# Patient Record
Sex: FEMALE | Race: BLACK OR AFRICAN AMERICAN | Employment: OTHER | URBAN - METROPOLITAN AREA
[De-identification: names, ages, dates, MRNs, and addresses within clinical notes are randomized per-mention and may not be internally consistent; named-entity substitution may affect disease eponyms.]

---

## 2019-08-19 ENCOUNTER — OFFICE VISIT (OUTPATIENT)
Dept: HEMATOLOGY | Age: 69
End: 2019-08-19

## 2019-08-19 ENCOUNTER — HOSPITAL ENCOUNTER (OUTPATIENT)
Dept: LAB | Age: 69
Discharge: HOME | End: 2019-08-19
Payer: MEDICARE

## 2019-08-19 VITALS
TEMPERATURE: 98.2 F | HEART RATE: 66 BPM | SYSTOLIC BLOOD PRESSURE: 154 MMHG | WEIGHT: 197.13 LBS | HEIGHT: 65 IN | BODY MASS INDEX: 32.84 KG/M2 | DIASTOLIC BLOOD PRESSURE: 78 MMHG | OXYGEN SATURATION: 98 %

## 2019-08-19 DIAGNOSIS — R74.8 ELEVATED LIVER ENZYMES: Primary | ICD-10-CM

## 2019-08-19 DIAGNOSIS — R74.8 ELEVATED LIVER ENZYMES: ICD-10-CM

## 2019-08-19 PROBLEM — E11.9 DM TYPE 2 (DIABETES MELLITUS, TYPE 2) (HCC): Status: ACTIVE | Noted: 2019-08-19

## 2019-08-19 PROBLEM — I10 ESSENTIAL HYPERTENSION: Status: ACTIVE | Noted: 2019-08-19

## 2019-08-19 LAB
ALBUMIN SERPL-MCNC: 3.4 G/DL (ref 3.4–5)
ALBUMIN/GLOB SERPL: 0.9 {RATIO} (ref 0.8–1.7)
ALP SERPL-CCNC: 81 U/L (ref 45–117)
ALT SERPL-CCNC: 65 U/L (ref 13–56)
ANION GAP SERPL CALC-SCNC: 7 MMOL/L (ref 3–18)
AST SERPL-CCNC: 45 U/L (ref 10–38)
BASOPHILS # BLD: 0 K/UL (ref 0–0.1)
BASOPHILS NFR BLD: 1 % (ref 0–2)
BILIRUB DIRECT SERPL-MCNC: 0.1 MG/DL (ref 0–0.2)
BILIRUB SERPL-MCNC: 0.3 MG/DL (ref 0.2–1)
BUN SERPL-MCNC: 43 MG/DL (ref 7–18)
BUN/CREAT SERPL: 34 (ref 12–20)
CALCIUM SERPL-MCNC: 9.1 MG/DL (ref 8.5–10.1)
CHLORIDE SERPL-SCNC: 109 MMOL/L (ref 100–111)
CO2 SERPL-SCNC: 29 MMOL/L (ref 21–32)
CREAT SERPL-MCNC: 1.25 MG/DL (ref 0.6–1.3)
DIFFERENTIAL METHOD BLD: ABNORMAL
EOSINOPHIL # BLD: 0.6 K/UL (ref 0–0.4)
EOSINOPHIL NFR BLD: 9 % (ref 0–5)
ERYTHROCYTE [DISTWIDTH] IN BLOOD BY AUTOMATED COUNT: 13.9 % (ref 11.6–14.5)
FERRITIN SERPL-MCNC: 922 NG/ML (ref 8–388)
GLOBULIN SER CALC-MCNC: 3.8 G/DL (ref 2–4)
GLUCOSE SERPL-MCNC: 130 MG/DL (ref 74–99)
HCT VFR BLD AUTO: 32.6 % (ref 35–45)
HGB BLD-MCNC: 10.5 G/DL (ref 12–16)
IRON SATN MFR SERPL: 26 %
IRON SERPL-MCNC: 67 UG/DL (ref 50–175)
LYMPHOCYTES # BLD: 2.8 K/UL (ref 0.9–3.6)
LYMPHOCYTES NFR BLD: 42 % (ref 21–52)
MCH RBC QN AUTO: 28.4 PG (ref 24–34)
MCHC RBC AUTO-ENTMCNC: 32.2 G/DL (ref 31–37)
MCV RBC AUTO: 88.1 FL (ref 74–97)
MONOCYTES # BLD: 0.3 K/UL (ref 0.05–1.2)
MONOCYTES NFR BLD: 5 % (ref 3–10)
NEUTS SEG # BLD: 2.9 K/UL (ref 1.8–8)
NEUTS SEG NFR BLD: 43 % (ref 40–73)
PLATELET # BLD AUTO: 267 K/UL (ref 135–420)
PMV BLD AUTO: 10.9 FL (ref 9.2–11.8)
POTASSIUM SERPL-SCNC: 4 MMOL/L (ref 3.5–5.5)
PROT SERPL-MCNC: 7.2 G/DL (ref 6.4–8.2)
RBC # BLD AUTO: 3.7 M/UL (ref 4.2–5.3)
SODIUM SERPL-SCNC: 145 MMOL/L (ref 136–145)
TIBC SERPL-MCNC: 255 UG/DL (ref 250–450)
WBC # BLD AUTO: 6.7 K/UL (ref 4.6–13.2)

## 2019-08-19 PROCEDURE — 83516 IMMUNOASSAY NONANTIBODY: CPT

## 2019-08-19 PROCEDURE — 82728 ASSAY OF FERRITIN: CPT

## 2019-08-19 PROCEDURE — 85025 COMPLETE CBC W/AUTO DIFF WBC: CPT

## 2019-08-19 PROCEDURE — 82103 ALPHA-1-ANTITRYPSIN TOTAL: CPT

## 2019-08-19 PROCEDURE — 86256 FLUORESCENT ANTIBODY TITER: CPT

## 2019-08-19 PROCEDURE — 86704 HEP B CORE ANTIBODY TOTAL: CPT

## 2019-08-19 PROCEDURE — 80076 HEPATIC FUNCTION PANEL: CPT

## 2019-08-19 PROCEDURE — 87340 HEPATITIS B SURFACE AG IA: CPT

## 2019-08-19 PROCEDURE — 83540 ASSAY OF IRON: CPT

## 2019-08-19 PROCEDURE — 80048 BASIC METABOLIC PNL TOTAL CA: CPT

## 2019-08-19 PROCEDURE — 36415 COLL VENOUS BLD VENIPUNCTURE: CPT

## 2019-08-19 PROCEDURE — 86708 HEPATITIS A ANTIBODY: CPT

## 2019-08-19 PROCEDURE — 86706 HEP B SURFACE ANTIBODY: CPT

## 2019-08-19 PROCEDURE — 86038 ANTINUCLEAR ANTIBODIES: CPT

## 2019-08-19 RX ORDER — MELATONIN
DAILY
COMMUNITY

## 2019-08-19 RX ORDER — LOSARTAN POTASSIUM AND HYDROCHLOROTHIAZIDE 12.5; 1 MG/1; MG/1
1 TABLET ORAL DAILY
COMMUNITY
End: 2021-11-15

## 2019-08-19 RX ORDER — ALLOPURINOL 300 MG/1
TABLET ORAL DAILY
COMMUNITY

## 2019-08-19 RX ORDER — CHOLECALCIFEROL (VITAMIN D3) 125 MCG
50 CAPSULE ORAL DAILY
COMMUNITY

## 2019-08-19 RX ORDER — ASPIRIN 81 MG/1
TABLET ORAL DAILY
COMMUNITY

## 2019-08-19 RX ORDER — BRIMONIDINE TARTRATE, TIMOLOL MALEATE 2; 5 MG/ML; MG/ML
1 SOLUTION/ DROPS OPHTHALMIC EVERY 12 HOURS
COMMUNITY

## 2019-08-19 RX ORDER — LOVASTATIN 20 MG/1
20 TABLET ORAL
COMMUNITY
End: 2022-06-06

## 2019-08-19 NOTE — Clinical Note
9/14/19 Patient: Ayad Ribeiro YOB: 1950 Date of Visit: 8/19/2019 Murrell Carrel, MD 
[address] Floor [facility] [address] [address] VIA Facsimile: [phone] Dear Murrell Carrel, MD, Thank you for referring Ms. Ayad Ribeiro to [facility] [address] for evaluation. My notes for this consultation are attached. If you have questions, please do not hesitate to call me. I look forward to following your patient along with you. Sincerely, Cari Smith MD

## 2019-08-19 NOTE — PROGRESS NOTES
[address] [place] [address], Annita VELAZQUEZ Micael Asper, MD Sarita Ireland, PA-C Rochester Burow, ACNP-FRANCISCO Franco [facility] Hospital[facility]-BC   JOSE G Bolton, [facility]       Amira Deputado [address]    at [address], [address] [place] [address], [facility] 22.    [phone]    FAX: [address] [place] [address], [address]    [phone]    FAX: [phone]       Patient Care Team:  Johan Ruiz MD as PCP - General (Internal Medicine)  Mandie Gan MD (Gastroenterology)  Monie Shaver DPM (Podiatry)  Jesse Nuñez MD (Nephrology)  Yazmin Alvarado NP (Nurse Practitioner)  Janette Baker MD (Ophthalmology)      Problem List  Never Reviewed          Codes Class Noted    Essential hypertension ICD-10-CM: I10  ICD-9-CM: 401.9  8/19/2019        DM type 2 (diabetes mellitus, type 2) ([facility] ICD-10-CM: E11.9  ICD-9-CM: 250.00  8/19/2019        Elevated liver enzymes ICD-10-CM: R74.8  ICD-9-CM: 790.5  8/19/2019              The clinicians listed above have asked me to see Miles Bermudez in consultation regarding elevated liver enzymes and its management. All medical records sent by the referring physicians were reviewed including laboratory studies. The patient is a 81 y.o. black female who was found to have elevated liver enzymes in 2017. Shilo Luong Serologic evaluation for markers of chronic liver disease has either not been performed or the results are not available to me. A liver ultrasound was performed in 8/2017. The patient believes this demonstrated fatty liver. An assessment of liver fibrosis with biopsy or elastography has not been performed.         The patient had not started any new medications within 3 months preceding the elevation in liver chemistries. The patient has no symptoms which can be attributed to the liver disorder. The patient has not experienced the following symptoms: fatigue, pain in the right side over the liver, yellowing of the eyes or skin,     The patient completes all daily activities without any functional limitations. ASSESSMENT AND PLAN:  Elevated liver enzymes  Persistent elevation in liver transaminases of unclear etiology at this time. ALP is normal.  Liver function is normal.  The platelet count is normal.      Serologic testing for causes of chronic liver disease were positive for JASS     Have performed laboratory testing to monitor liver function and degree of liver injury. This included BMP, hepatic panel, CBC with platelet count,     Will perform imaging of the liver with ultrasound. The need to assess liver fibrosis was discussed. Sheer wave elastography can assess liver fibrosis and determine if a patient has advanced fibrosis or cirrhosis without the need for liver biopsy. This will be scheduled. If the elastography suggests advanced fibrosis then a liver biopsy should be considered. The elastography can be repeated annually or as often as clinically indicated to assess for fibrosis progression and/or regression. Screening for Hepatocellular Carcinoma  HCC screening is not necessary if the patient has no evidence of cirrhosis. Treatment of other medical problems in patients with chronic liver disease  There are no contraindications for the patient to take most medications that are necessary for treatment of other medical issues. Counseling for alcohol in patients with chronic liver disease  The patient was counseled regarding alcohol consumption and the effect of alcohol on chronic liver disease. The patient does not consume any significant amount of alcohol.     Vaccinations   Vaccination for viral hepatitis A is not needed. The patient has serologic evidence of prior exposure or vaccination with immunity. Routine vaccinations against other bacterial and viral agents can be performed as indicated. Annual flu vaccination should be administered if indicated. ALLERGIES  Allergies not on file    MEDICATIONS  Current Outpatient Medications   Medication Sig    losartan-hydroCHLOROthiazide (HYZAAR) 100-12.5 mg per tablet Take 1 Tab by mouth daily.  insulin NPH/insulin regular (NOVOLIN 70/30 U-100 INSULIN) 100 unit/mL (70-30) injection by SubCUTAneous route.  iron fum,fh-[id number]-FA-Ca-succ (MULTIGEN PLUS) tablet Take 1 Tab by mouth daily.  brimonidine-timolol (COMBIGAN) 0.2-0.5 % drop ophthalmic solution 1 Drop every twelve (12) hours.  CALCIUM OYSTER SHELL PO Take  by mouth.  cholecalciferol (VITAMIN D3) 1,000 unit tablet Take  by mouth daily.  allopurinol (ZYLOPRIM) 300 mg tablet Take  by mouth daily.  lovastatin (MEVACOR) 20 mg tablet Take 20 mg by mouth nightly.  aspirin delayed-release 81 mg tablet Take  by mouth daily.  co-enzyme Q-10 (COQ-10) 100 mg capsule Take 50 mg by mouth daily. No current facility-administered medications for this visit. SYSTEM REVIEW NOT RELATED TO LIVER DISEASE OR REVIEWED ABOVE:  Constitution systems: Negative for fever, chills, weight gain, weight loss. Eyes: Negative for visual changes. ENT: Negative for sore throat, painful swallowing. Respiratory: Negative for cough, hemoptysis, SOB. Cardiology: Negative for chest pain, palpitations. GI:  Negative for constipation or diarrhea. : Negative for urinary frequency, dysuria, hematuria, nocturia. Skin: Negative for rash. Hematology: Negative for easy bruising, blood clots. Musculo-skelatal: Negative for back pain, muscle pain, weakness. Neurologic: Negative for headaches, dizziness, vertigo, memory problems not related to HE. Psychology: Negative for anxiety, depression.        FAMILY HISTORY:  The father is . The mother is . There is no family history of liver disease. There is no family history of immune disorders. SOCIAL HISTORY:  The patient is . The patient has 3 children, 4 grandchildren. 3 great grand children. The patient has never used tobacco products. The patient does not drink alcohol   The patient is retired. PHYSICAL EXAMINATION:  Visit Vitals  /78   Pulse 66   Temp 98.2 °F (36.8 °C) (Tympanic)   Ht 5' 5\" (1.651 m)   Wt 197 lb 2 oz (89.4 kg)   SpO2 98%   BMI 32.80 kg/m²     General: No acute distress. Eyes: Sclera anicteric. ENT: No oral lesions. Thyroid normal.  Nodes: No adenopathy. Skin: No spider angiomata. No jaundice. No palmar erythema. Respiratory: Lungs clear to auscultation. Cardiovascular: Regular heart rate. No murmurs. No JVD. Abdomen: Soft non-tender. Liver size normal to percussion/palpation. Spleen not palpable. No obvious ascites. Extremities: No edema. No muscle wasting. No gross arthritic changes. Neurologic: Alert and oriented. Cranial nerves grossly intact. No asterixis.     LABORATORY STUDIES:  Liver [place] of [address] Units 2019   WBC 4.6 - 13.2 K/uL 6.7   ANC 1.8 - 8.0 K/UL 2.9   HGB 12.0 - 16.0 g/dL 10.5 (L)    - 420 K/uL 267   AST 10 - 38 U/L 45 (H)   ALT 13 - 56 U/L 65 (H)   Alk Phos 45 - 117 U/L 81   Bili, Total 0.2 - 1.0 MG/DL 0.3   Bili, Direct 0.0 - 0.2 MG/DL 0.1   Albumin 3.4 - 5.0 g/dL 3.4   BUN 7.0 - 18 MG/DL 43 (H)   Creat 0.6 - 1.3 MG/DL 1.25   Na 136 - 145 mmol/L 145   K 3.5 - 5.5 mmol/L 4.0   Cl 100 - 111 mmol/L 109   CO2 21 - 32 mmol/L 29   Glucose 74 - 99 mg/dL 130 (H)     SEROLOGIES:  Serologies Latest Ref Rng & Units 2019   Hep A Ab, Total NEGATIVE   Positive (A)   Hep B Surface Ag <1.00 Index 0.21   Hep B Surface Ag Interp NEG   NEGATIVE   Hep B Core Ab, Total NEGATIVE   NEGATIVE   Hep B Surface Ab >10.0 mIU/mL <3.10 (L)   Hep B Surface Ab Interp POS   NEGATIVE (A)   Ferritin 8 - 388 NG/ (H)   Iron % Saturation % 26   JASS, IFA  Positive (A)   JASS Pattern  1:320 (H)   JASS Pattern  1:640 (H)   C-ANCA Neg:<1:20 titer <1:20   P-ANCA Neg:<1:20 titer <1:20   ANCA Neg:<1:20 titer <1:20   ASMCA 0 - 19 Units 6   M2 Ab 0.0 - 20.0 Units <20.0   Anti-SLA 0.0 - 20.0 units 0.8   Alpha-1 antitrypsin level 90 - 200 mg/dL 112     LIVER HISTOLOGY:  Not available or performed    ENDOSCOPIC PROCEDURES:  Not available or performed    RADIOLOGY:  8/2017. Ultrasound of liver. Echogenic consistent with fatty liver. No liver mass lesions. No dilated bile ducts. No ascites. OTHER TESTING:  Not available or performed    FOLLOW-UP:  All of the issues listed above in the Assessment and Plan were discussed with the patient. All questions were answered. The patient expressed a clear understanding of the above. [address] [place] [address] in 4 weeks for elastography to review all data and determine the treatment plan. Nic Farrar MD, MPH  [facility] 22. [phone]  Fransisco Mcknight MD  I have personally interviewed and examined the patient during the encounter. I have explained the key findings related to the liver disease and other pertinent diagnoses as noted above to the patient and answered all questions. I have reviewed and agree with the findings documented above, including all diagnostic interpretations, and plans. I have edited the original note as appropriate for clarity.     MD Con Kenney [address] [place] [address] 22.  [phone]  [address] [place] [address]

## 2019-08-20 LAB
HBV SURFACE AB SER QL IA: NEGATIVE
HBV SURFACE AB SERPL IA-ACNC: <3.1 MIU/ML
HBV SURFACE AG SER QL: 0.21 INDEX
HBV SURFACE AG SER QL: NEGATIVE
HEP BS AB COMMENT,HBSAC: ABNORMAL

## 2019-08-21 LAB
A1AT SERPL-MCNC: 112 MG/DL (ref 90–200)
ACTIN IGG SERPL-ACNC: 6 UNITS (ref 0–19)
HAV AB SER QL IA: POSITIVE
HBV CORE AB SERPL QL IA: NEGATIVE
MITOCHONDRIA M2 IGG SER-ACNC: <20 UNITS (ref 0–20)

## 2019-08-22 LAB
ANA HOMOGEN TITR SER: ABNORMAL {TITER}
ANA SPECKLED TITR SER: ABNORMAL {TITER}
ANA TITR SER IF: POSITIVE {TITER}
C-ANCA TITR SER IF: NORMAL TITER
NOTE:, 016270: ABNORMAL
P-ANCA ATYPICAL TITR SER IF: NORMAL TITER
P-ANCA TITR SER IF: NORMAL TITER
SOLUBLE LIVER IGG SER IA-ACNC: 0.8 UNITS (ref 0–20)

## 2019-09-23 ENCOUNTER — HOSPITAL ENCOUNTER (OUTPATIENT)
Dept: ULTRASOUND IMAGING | Age: 69
Discharge: HOME | End: 2019-09-23
Payer: MEDICARE

## 2019-09-23 DIAGNOSIS — R74.8 ELEVATED LIVER ENZYMES: ICD-10-CM

## 2019-09-23 PROCEDURE — 76981 USE PARENCHYMA: CPT

## 2019-10-28 ENCOUNTER — OFFICE VISIT (OUTPATIENT)
Dept: HEMATOLOGY | Age: 69
End: 2019-10-28

## 2019-10-28 VITALS
HEIGHT: 65 IN | HEART RATE: 59 BPM | WEIGHT: 193.38 LBS | SYSTOLIC BLOOD PRESSURE: 145 MMHG | OXYGEN SATURATION: 98 % | DIASTOLIC BLOOD PRESSURE: 65 MMHG | TEMPERATURE: 98 F | BODY MASS INDEX: 32.22 KG/M2

## 2019-10-28 DIAGNOSIS — R74.8 ELEVATED LIVER ENZYMES: Primary | ICD-10-CM

## 2019-10-28 NOTE — PROGRESS NOTES
[address] [facility], MD, Jobe Eaves, Serafina Closs, MD Dalene Kindle, HERBIE Andersen, ACNP-BC     April S Salvador, PCNP-BC   TRUPTI ShoemakerP-RYLEE Oliverosa Haley, [facility]       Amira Deput[address]    at [address], [address] [place] [address], [facility] 22.    [phone]    FAX: [address] [place] [address]    at [address], [address]    [phone]    FAX: [phone]       Patient Care Team:  Cristiane Sanchez MD as PCP - General (Internal Medicine)  Michelle Delaney MD (Gastroenterology)  Liss Street DPM (Podiatry)  Jaylin Villalpando MD (Nephrology)  Checo Stephens NP (Nurse Practitioner)  Reyna Thompson MD (Ophthalmology)      Problem List  Date Reviewed: 9/14/2019          Codes Class Noted    Essential hypertension ICD-10-CM: I10  ICD-9-CM: 401.9  8/19/2019        DM type 2 (diabetes mellitus, type 2) ([facility] ICD-10-CM: E11.9  ICD-9-CM: 250.00  8/19/2019        Elevated liver enzymes ICD-10-CM: R74.8  ICD-9-CM: 790.5  8/19/2019              Esperanza Helms returns to the [address] for management of elevated liver enzymes. The active problem list, all pertinent past medical history, medications, radiologic findings and laboratory findings related to the liver disorder were reviewed with the patient. The patient is a 81 y.o. black female who was found to have elevated liver enzymes in 2017. Wilbert Stoll Serologic evaluation for markers of chronic liver disease has either not been performed or the results are not available to me. The most recent imaging of the liver was Ultrasound performed in 9/2019. Results suggest fatty liver disease.         Assessment of liver fibrosis was performed with sheer wave elastogrphy at [facility] in 9/2019. The result was 6 kPa which correlates with stage 1 portal fibrosis      The patient had not started any new medications within 3 months preceding the elevation in liver chemistries. The patient has no symptoms which can be attributed to the liver disorder. The patient has not experienced the following symptoms: fatigue, pain in the right side over the liver, yellowing of the eyes or skin,     The patient completes all daily activities without any functional limitations. ASSESSMENT AND PLAN:  Elevated liver enzymes  Persistent elevation in liver transaminases of unclear etiology at this time. ALP is normal.  Liver function is normal.  The platelet count is normal.      Serologic testing for causes of chronic liver disease were positive for JASS    The most likely causes for the liver chemistry abnormalities were discussed with the patient and include fatty liver disease, immune liver disorders,     If the patient looses 20% of current body weight, which is 38 pounds, down to a weight of of 160 pounds, all steatosis will have resolved. Once all steatosis has resolved all inflammation will resolve. Then all fibrosis will gradually resolve and the liver could eventually be normal.    If weight loss is successful hepatic steatosis will resolve and liver enzymes should return to the normal range. The Fibroscan can be repeated annually or as often as clinically indicated to assess for progression or regression of fibrosis. Since a liver biopsy was not performed it is possible the patient may not have fatty liver or this may not be contributing to the elevation in liver enzymes. If liver enzymes do not return to normal with weight reduction then additional evaluation including liver biopsy may be necessary to determine if the elevated liver enzymes is due to another etiology.     Counseling for diet and weight loss in patients with confirmed or suspected NAFLD  The patient was counseled regarding diet and exercise to achieve weight loss. The best diet for patients with fatty liver is one very low in carbohydrates and enriched with protein such as an Edith's program.      The patient was told not to consume any food products and drinks containing fructose as this enhances hepatic fat synthesis. There is no medication or vitamin supplements that we advocate for LAGOS. Using glitazones in patients without diabetes mellitus has been shown to reduce fat content in the liver but has no effect on fibrosis and is associated with weight gain. Vitamin E has also been used but the data is not very good and most experts no longer advocate this. Screening for Hepatocellular Carcinoma  HCC screening is not necessary if the patient has no evidence of cirrhosis. Treatment of other medical problems in patients with chronic liver disease  There are no contraindications for the patient to take most medications that are necessary for treatment of other medical issues. Counseling for alcohol in patients with chronic liver disease  The patient was counseled regarding alcohol consumption and the effect of alcohol on chronic liver disease. The patient does not consume any significant amount of alcohol. Vaccinations   Vaccination for viral hepatitis A is not needed. The patient has serologic evidence of prior exposure or vaccination with immunity. Routine vaccinations against other bacterial and viral agents can be performed as indicated. Annual flu vaccination should be administered if indicated. ALLERGIES  Not on File    MEDICATIONS  Current Outpatient Medications   Medication Sig    losartan-hydroCHLOROthiazide (HYZAAR) 100-12.5 mg per tablet Take 1 Tab by mouth daily.  insulin NPH/insulin regular (NOVOLIN 70/30 U-100 INSULIN) 100 unit/mL (70-30) injection by SubCUTAneous route.     iron fum,[id number]-Ca-succ (MULTIGEN PLUS) tablet Take 1 Tab by mouth daily.  brimonidine-timolol (COMBIGAN) 0.2-0.5 % drop ophthalmic solution 1 Drop every twelve (12) hours.  CALCIUM OYSTER SHELL PO Take  by mouth.  cholecalciferol (VITAMIN D3) 1,000 unit tablet Take  by mouth daily.  allopurinol (ZYLOPRIM) 300 mg tablet Take  by mouth daily.  lovastatin (MEVACOR) 20 mg tablet Take 20 mg by mouth nightly.  aspirin delayed-release 81 mg tablet Take  by mouth daily.  co-enzyme Q-10 (COQ-10) 100 mg capsule Take 50 mg by mouth daily. No current facility-administered medications for this visit. SYSTEM REVIEW NOT RELATED TO LIVER DISEASE OR REVIEWED ABOVE:  Constitution systems: Negative for fever, chills, weight gain, weight loss. Eyes: Negative for visual changes. ENT: Negative for sore throat, painful swallowing. Respiratory: Negative for cough, hemoptysis, SOB. Cardiology: Negative for chest pain, palpitations. GI:  Negative for constipation or diarrhea. : Negative for urinary frequency, dysuria, hematuria, nocturia. Skin: Negative for rash. Hematology: Negative for easy bruising, blood clots. Musculo-skelatal: Negative for back pain, muscle pain, weakness. Neurologic: Negative for headaches, dizziness, vertigo, memory problems not related to HE. Psychology: Negative for anxiety, depression. FAMILY HISTORY:  The father is . The mother is . There is no family history of liver disease. There is no family history of immune disorders. SOCIAL HISTORY:  The patient is . The patient has 3 children, 4 grandchildren. 3 great grand children. The patient has never used tobacco products. The patient does not drink alcohol   The patient is retired. PHYSICAL EXAMINATION:  Visit Vitals  /65   Pulse (!) 59   Temp 98 °F (36.7 °C) (Tympanic)   Ht 5' 5\" (1.651 m)   Wt 193 lb 6 oz (87.7 kg)   SpO2 98%   BMI 32.18 kg/m²     General: No acute distress. Eyes: Sclera anicteric.    ENT: No oral lesions. Thyroid normal.  Nodes: No adenopathy. Skin: No spider angiomata. No jaundice. No palmar erythema. Respiratory: Lungs clear to auscultation. Cardiovascular: Regular heart rate. No murmurs. No JVD. Abdomen: Soft non-tender. Liver size normal to percussion/palpation. Spleen not palpable. No obvious ascites. Extremities: No edema. No muscle wasting. No gross arthritic changes. Neurologic: Alert and oriented. Cranial nerves grossly intact. No asterixis. LABORATORY STUDIES:  [facility] [place] [address] & Units 8/19/2019   WBC 4.6 - 13.2 K/uL 6.7   ANC 1.8 - 8.0 K/UL 2.9   HGB 12.0 - 16.0 g/dL 10.5 (L)    - 420 K/uL 267   AST 10 - 38 U/L 45 (H)   ALT 13 - 56 U/L 65 (H)   Alk Phos 45 - 117 U/L 81   Bili, Total 0.2 - 1.0 MG/DL 0.3   Bili, Direct 0.0 - 0.2 MG/DL 0.1   Albumin 3.4 - 5.0 g/dL 3.4   BUN 7.0 - 18 MG/DL 43 (H)   Creat 0.6 - 1.3 MG/DL 1.25   Na 136 - 145 mmol/L 145   K 3.5 - 5.5 mmol/L 4.0   Cl 100 - 111 mmol/L 109   CO2 21 - 32 mmol/L 29   Glucose 74 - 99 mg/dL 130 (H)     SEROLOGIES:  Serologies Latest Ref Rng & Units 8/19/2019   Hep A Ab, Total NEGATIVE   Positive (A)   Hep B Surface Ag <1.00 Index 0.21   Hep B Surface Ag Interp NEG   NEGATIVE   Hep B Core Ab, Total NEGATIVE   NEGATIVE   Hep B Surface Ab >10.0 mIU/mL <3.10 (L)   Hep B Surface Ab Interp POS   NEGATIVE (A)   Ferritin 8 - 388 NG/ (H)   Iron % Saturation % 26   JASS, IFA  Positive (A)   JASS Pattern  1:320 (H)   JASS Pattern  1:640 (H)   C-ANCA Neg:<1:20 titer <1:20   P-ANCA Neg:<1:20 titer <1:20   ANCA Neg:<1:20 titer <1:20   ASMCA 0 - 19 Units 6   M2 Ab 0.0 - 20.0 Units <20.0   Anti-SLA 0.0 - 20.0 units 0.8   Alpha-1 antitrypsin level 90 - 200 mg/dL 112     LIVER HISTOLOGY:  9/2019. Sheer wave elastography performed at [facility]. E Range: 4.18-8.51 kPa, E Mean: 6.25 kPa, E Median: 5.93 kPa, E Std: 1.47 kPa.   The results suggested a fibrosis level of F1.    ENDOSCOPIC PROCEDURES:  Not available or performed    RADIOLOGY:  8/2017. Ultrasound of liver. Echogenic consistent with fatty liver. No liver mass lesions. No dilated bile ducts. No ascites. 9/2019. Ultrasound of liver. Echogenic consistent with fatty liver. No liver mass lesions. No dilated bile ducts. No ascites. OTHER TESTING:  Not available or performed    FOLLOW-UP:  All of the issues listed above in the Assessment and Plan were discussed with the patient. All questions were answered. The patient expressed a clear understanding of the above. [address] [place] [address] in 4 months to assess for the effects of diet changes and weight loss.       MD Zaheer Rosavägen [facility] [address], [address] [place] [address] 22.  [phone]  [address] [place] [address]

## 2019-10-28 NOTE — Clinical Note
12/25/19 Patient: Mayra More YOB: 1950 Date of Visit: 10/28/2019 Ricardo Aguilar MD 
[address] [place],Second Floor [facility] [address] [facility] [address] VIA Facsimile: [phone] Dear Ricardo Aguilar MD, Thank you for referring Ms. Mayra More to [address] [facility] [address] [place] for evaluation. My notes for this consultation are attached. If you have questions, please do not hesitate to call me. I look forward to following your patient along with you. Sincerely, Alma Juarez MD

## 2020-02-27 ENCOUNTER — OFFICE VISIT (OUTPATIENT)
Dept: HEMATOLOGY | Age: 70
End: 2020-02-27

## 2020-02-27 ENCOUNTER — HOSPITAL ENCOUNTER (OUTPATIENT)
Dept: LAB | Age: 70
Discharge: HOME | End: 2020-02-27
Payer: MEDICARE

## 2020-02-27 VITALS
RESPIRATION RATE: 16 BRPM | TEMPERATURE: 98.3 F | DIASTOLIC BLOOD PRESSURE: 70 MMHG | HEIGHT: 65 IN | OXYGEN SATURATION: 98 % | WEIGHT: 193.8 LBS | BODY MASS INDEX: 32.29 KG/M2 | HEART RATE: 56 BPM | SYSTOLIC BLOOD PRESSURE: 152 MMHG

## 2020-02-27 DIAGNOSIS — R74.8 ELEVATED LIVER ENZYMES: ICD-10-CM

## 2020-02-27 DIAGNOSIS — R74.8 ELEVATED LIVER ENZYMES: Primary | ICD-10-CM

## 2020-02-27 LAB
ALBUMIN SERPL-MCNC: 3.3 G/DL (ref 3.4–5)
ALBUMIN/GLOB SERPL: 0.8 {RATIO} (ref 0.8–1.7)
ALP SERPL-CCNC: 86 U/L (ref 45–117)
ALT SERPL-CCNC: 56 U/L (ref 13–56)
ANION GAP SERPL CALC-SCNC: 5 MMOL/L (ref 3–18)
AST SERPL-CCNC: 33 U/L (ref 10–38)
BASOPHILS # BLD: 0 K/UL (ref 0–0.1)
BASOPHILS NFR BLD: 0 % (ref 0–2)
BILIRUB DIRECT SERPL-MCNC: <0.1 MG/DL (ref 0–0.2)
BILIRUB SERPL-MCNC: 0.4 MG/DL (ref 0.2–1)
BUN SERPL-MCNC: 46 MG/DL (ref 7–18)
BUN/CREAT SERPL: 39 (ref 12–20)
CALCIUM SERPL-MCNC: 9.7 MG/DL (ref 8.5–10.1)
CHLORIDE SERPL-SCNC: 106 MMOL/L (ref 100–111)
CO2 SERPL-SCNC: 31 MMOL/L (ref 21–32)
CREAT SERPL-MCNC: 1.18 MG/DL (ref 0.6–1.3)
DIFFERENTIAL METHOD BLD: ABNORMAL
EOSINOPHIL # BLD: 0.4 K/UL (ref 0–0.4)
EOSINOPHIL NFR BLD: 6 % (ref 0–5)
ERYTHROCYTE [DISTWIDTH] IN BLOOD BY AUTOMATED COUNT: 14.1 % (ref 11.6–14.5)
GLOBULIN SER CALC-MCNC: 3.9 G/DL (ref 2–4)
GLUCOSE SERPL-MCNC: 136 MG/DL (ref 74–99)
HCT VFR BLD AUTO: 35.9 % (ref 35–45)
HGB BLD-MCNC: 11.3 G/DL (ref 12–16)
LYMPHOCYTES # BLD: 2.8 K/UL (ref 0.9–3.6)
LYMPHOCYTES NFR BLD: 41 % (ref 21–52)
MCH RBC QN AUTO: 27.4 PG (ref 24–34)
MCHC RBC AUTO-ENTMCNC: 31.5 G/DL (ref 31–37)
MCV RBC AUTO: 86.9 FL (ref 74–97)
MONOCYTES # BLD: 0.4 K/UL (ref 0.05–1.2)
MONOCYTES NFR BLD: 6 % (ref 3–10)
NEUTS SEG # BLD: 3.3 K/UL (ref 1.8–8)
NEUTS SEG NFR BLD: 47 % (ref 40–73)
PLATELET # BLD AUTO: 249 K/UL (ref 135–420)
PMV BLD AUTO: 10.7 FL (ref 9.2–11.8)
POTASSIUM SERPL-SCNC: 4.2 MMOL/L (ref 3.5–5.5)
PROT SERPL-MCNC: 7.2 G/DL (ref 6.4–8.2)
RBC # BLD AUTO: 4.13 M/UL (ref 4.2–5.3)
SODIUM SERPL-SCNC: 142 MMOL/L (ref 136–145)
WBC # BLD AUTO: 7 K/UL (ref 4.6–13.2)

## 2020-02-27 PROCEDURE — 80048 BASIC METABOLIC PNL TOTAL CA: CPT

## 2020-02-27 PROCEDURE — 85025 COMPLETE CBC W/AUTO DIFF WBC: CPT

## 2020-02-27 PROCEDURE — 80076 HEPATIC FUNCTION PANEL: CPT

## 2020-02-27 PROCEDURE — 36415 COLL VENOUS BLD VENIPUNCTURE: CPT

## 2020-02-27 NOTE — PROGRESS NOTES
[address] [place] [address], MD, Renato Sutton MD Julianna Fiedler, HERBIE Alvarado, [facility]     Tawny HARGROVE Salvador, [facility]   Nayla Darek, FNP-C    Felipe Ibarra, [facility]       Amira Frye [facility] [place] 136    at [address]    217 [facility], [address] [place] [address] 22.    [phone]    FAX: [address] [place] [address], [address]    [phone]    FAX: [phone]       Patient Care Team:  Amarilis Delgado MD as PCP - General (Internal Medicine)  Martha López MD (Gastroenterology)  Francisca Cardona DPM (Podiatry)  Omaira Sousa MD (Nephrology)  Isabell Oneal NP (Nurse Practitioner)  Lorena Esquivel MD (Ophthalmology)      Problem List  Date Reviewed: 12/25/2019          Codes Class Noted    Essential hypertension ICD-10-CM: I10  ICD-9-CM: 401.9  8/19/2019        DM type 2 (diabetes mellitus, type 2) ([facility] ICD-10-CM: E11.9  ICD-9-CM: 250.00  8/19/2019        Elevated liver enzymes ICD-10-CM: R74.8  ICD-9-CM: 790.5  8/19/2019              Ernestine Baez returns to the [address] for management of elevated liver enzymes. The active problem list, all pertinent past medical history, medications, radiologic findings and laboratory findings related to the liver disorder were reviewed with the patient. The patient is a 78 y.o.  female who was found to have elevated liver enzymes in 2017. Serologic evaluation for markers of chronic liver disease were positive for JASS. The most recent imaging of the liver was Ultrasound performed in 9/2019. Results suggest fatty liver disease. Assessment of liver fibrosis was performed with Fibroscan at today's office visit.  The result was E mean 6.4 kPa which correlates with stage 1 portal fibrosis. The patient had not started any new medications within 3 months preceding the elevation in liver chemistries. The patient has no symptoms which can be attributed to the liver disorder. The patient has not experienced the following symptoms: fatigue, pain in the right side over the liver, yellowing of the eyes or skin,     The patient completes all daily activities without any functional limitations. ASSESSMENT AND PLAN:  Elevated liver enzymes  Persistent elevation in liver transaminases of unclear etiology at this time. ALP is normal.  Liver function is normal.  The platelet count is normal.      Serologic testing for causes of chronic liver disease were positive for JASS    The most likely causes for the liver chemistry abnormalities were discussed with the patient and include fatty liver disease, immune liver disorders,     If the patient loses 20% of current body weight, which is 38 pounds, down to a weight of 160 pounds, all steatosis will have resolved. Once all steatosis has resolved all inflammation will resolve. Then all fibrosis will gradually resolve and the liver could eventually be normal.    If weight loss is successful hepatic steatosis will resolve and liver enzymes should return to the normal range. The Fibroscan can be repeated annually or as often as clinically indicated to assess for progression or regression of fibrosis. Since a liver biopsy was not performed it is possible the patient may not have fatty liver or this may not be contributing to the elevation in liver enzymes. If liver enzymes do not return to normal with weight reduction then additional evaluation including liver biopsy may be necessary to determine if the elevated liver enzymes is due to another etiology.     Counseling for diet and weight loss in patients with confirmed or suspected NAFLD  The patient was counseled regarding diet and exercise to achieve weight loss. The best diet for patients with fatty liver is one very low in carbohydrates and enriched with protein such as an Edith's program.      The patient was told not to consume any food products and drinks containing fructose as this enhances hepatic fat synthesis. There is no medication or vitamin supplements that we advocate for LAGOS. Using glitazones in patients without diabetes mellitus has been shown to reduce fat content in the liver but has no effect on fibrosis and is associated with weight gain. Vitamin E has also been used but the data is not very good and most experts no longer advocate this. Positive serology for autoimmune liver disease  The positive JASS suggests that there may be an underlying autoimmune liver disease. Given that the liver enzymes have returned to normal it is unlikely there is an autoimmune liver disorder. Will continue to monitor to see if the liver enzymes remain normal, fluctuate or become persistently elevated. Screening for Hepatocellular Carcinoma  HCC screening is not necessary if the patient has no evidence of cirrhosis. Treatment of other medical problems in patients with chronic liver disease  There are no contraindications for the patient to take most medications that are necessary for treatment of other medical issues. Counseling for alcohol in patients with chronic liver disease  The patient was counseled regarding alcohol consumption and the effect of alcohol on chronic liver disease. The patient does not consume any significant amount of alcohol. Vaccinations   Vaccination for viral hepatitis A is not needed. The patient has serologic evidence of prior exposure or vaccination with immunity. Routine vaccinations against other bacterial and viral agents can be performed as indicated. Annual flu vaccination should be administered if indicated.       ALLERGIES  No Known Allergies    MEDICATIONS  Current Outpatient Medications   Medication Sig    losartan-hydroCHLOROthiazide (HYZAAR) 100-12.5 mg per tablet Take 1 Tab by mouth daily.  insulin NPH/insulin regular (NOVOLIN 70/30 U-100 INSULIN) 100 unit/mL (70-30) injection by SubCUTAneous route.  iron fum,ou-B-A75-FA-Ca-succ (MULTIGEN PLUS) tablet Take 1 Tab by mouth daily.  brimonidine-timolol (COMBIGAN) 0.2-0.5 % drop ophthalmic solution 1 Drop every twelve (12) hours.  CALCIUM OYSTER SHELL PO Take  by mouth.  cholecalciferol (VITAMIN D3) 1,000 unit tablet Take  by mouth daily.  allopurinol (ZYLOPRIM) 300 mg tablet Take  by mouth daily.  lovastatin (MEVACOR) 20 mg tablet Take 20 mg by mouth nightly.  aspirin delayed-release 81 mg tablet Take  by mouth daily.  co-enzyme Q-10 (COQ-10) 100 mg capsule Take 50 mg by mouth daily. No current facility-administered medications for this visit. SYSTEM REVIEW NOT RELATED TO LIVER DISEASE OR REVIEWED ABOVE:  Constitution systems: Negative for fever, chills, weight gain, weight loss. Eyes: Negative for visual changes. ENT: Negative for sore throat, painful swallowing. Respiratory: Negative for cough, hemoptysis, SOB. Cardiology: Negative for chest pain, palpitations. GI:  Negative for constipation or diarrhea. : Negative for urinary frequency, dysuria, hematuria, nocturia. Skin: Negative for rash. Hematology: Negative for easy bruising, blood clots. Musculo-skelatal: Negative for back pain, muscle pain, weakness. Neurologic: Negative for headaches, dizziness, vertigo, memory problems not related to HE. Psychology: Negative for anxiety, depression. FAMILY HISTORY:  The father is . The mother is . There is no family history of liver disease. There is no family history of immune disorders. SOCIAL HISTORY:  The patient is . The patient has 3 children, 4 grandchildren. 3 great grand children.    The patient has never used tobacco products. The patient does not drink alcohol   The patient is retired. PHYSICAL EXAMINATION:  Visit Vitals  /70 (BP 1 Location: Right arm, BP Patient Position: Sitting)   Pulse (!) 56   Temp 98.3 °F (36.8 °C) (Tympanic)   Resp 16   Ht 5' 5\" (1.651 m)   Wt 193 lb 12.8 oz (87.9 kg)   SpO2 98%   BMI 32.25 kg/m²     General: No acute distress. Eyes: Sclera anicteric. ENT: No oral lesions. Thyroid normal.  Nodes: No adenopathy. Skin: No spider angiomata. No jaundice. No palmar erythema. Respiratory: Lungs clear to auscultation. Cardiovascular: Regular heart rate. No murmurs. No JVD. Abdomen: Soft non-tender. Liver size normal to percussion/palpation. Spleen not palpable. No obvious ascites. Extremities: No edema. No muscle wasting. No gross arthritic changes. Neurologic: Alert and oriented. Cranial nerves grossly intact. No asterixis.     LABORATORY STUDIES:  Liver [place] of [address] & Units 2/27/2020 8/19/2019   WBC 4.6 - 13.2 K/uL 7.0 6.7   ANC 1.8 - 8.0 K/UL 3.3 2.9   HGB 12.0 - 16.0 g/dL 11.3 (L) 10.5 (L)    - 420 K/uL 249 267   AST 10 - 38 U/L 33 45 (H)   ALT 13 - 56 U/L 56 65 (H)   Alk Phos 45 - 117 U/L 86 81   Bili, Total 0.2 - 1.0 MG/DL 0.4 0.3   Bili, Direct 0.0 - 0.2 MG/DL <0.1 0.1   Albumin 3.4 - 5.0 g/dL 3.3 (L) 3.4   BUN 7.0 - 18 MG/DL 46 (H) 43 (H)   Creat 0.6 - 1.3 MG/DL 1.18 1.25   Na 136 - 145 mmol/L 142 145   K 3.5 - 5.5 mmol/L 4.2 4.0   Cl 100 - 111 mmol/L 106 109   CO2 21 - 32 mmol/L 31 29   Glucose 74 - 99 mg/dL 136 (H) 130 (H)     SEROLOGIES:  Serologies Latest Ref Rng & Units 8/19/2019   Hep A Ab, Total NEGATIVE   Positive (A)   Hep B Surface Ag <1.00 Index 0.21   Hep B Surface Ag Interp NEG   NEGATIVE   Hep B Core Ab, Total NEGATIVE   NEGATIVE   Hep B Surface Ab >10.0 mIU/mL <3.10 (L)   Hep B Surface Ab Interp POS   NEGATIVE (A)   Ferritin 8 - 388 NG/ (H)   Iron % Saturation % 26   JASS, IFA Positive (A)   JASS Pattern  1:320 (H)   JASS Pattern  1:640 (H)   C-ANCA Neg:<1:20 titer <1:20   P-ANCA Neg:<1:20 titer <1:20   ANCA Neg:<1:20 titer <1:20   ASMCA 0 - 19 Units 6   M2 Ab 0.0 - 20.0 Units <20.0   Anti-SLA 0.0 - 20.0 units 0.8   Alpha-1 antitrypsin level 90 - 200 mg/dL 112     LIVER HISTOLOGY:  9/2019. Shear wave elastography performed at [facility]. E Range: 4.18-8.51 kPa, E Mean: 6.25 kPa, E Median: 5.93 kPa, E Std: 1.47 kPa. The results suggested a fibrosis level of F1.  2/2020. FibroScan performed at [address]. EkPa was 6.4. IQR/med 17%. . The results suggested a fibrosis level of F1. The CAP score suggests fatty liver      ENDOSCOPIC PROCEDURES:  Not available or performed    RADIOLOGY:  8/2017. Ultrasound of liver. Echogenic consistent with fatty liver. No liver mass lesions. No dilated bile ducts. No ascites. 9/2019. Ultrasound of liver. Echogenic consistent with fatty liver. No liver mass lesions. No dilated bile ducts. No ascites. OTHER TESTING:  Not available or performed    FOLLOW-UP:  All of the issues listed above in the Assessment and Plan were discussed with the patient. All questions were answered. The patient expressed a clear understanding of the above. [address] [facility] 87 in 6 months to assess for the effects of diet changes and weight loss.       CHADWICK Rodriguez  . Keaton Shipman [facility] Liver [facility]  [address], [address]  98 [facility], [address]  [phone]

## 2020-08-27 ENCOUNTER — OFFICE VISIT (OUTPATIENT)
Dept: HEMATOLOGY | Age: 70
End: 2020-08-27

## 2020-08-27 VITALS
TEMPERATURE: 98.2 F | DIASTOLIC BLOOD PRESSURE: 76 MMHG | BODY MASS INDEX: 31.62 KG/M2 | WEIGHT: 189.8 LBS | HEIGHT: 65 IN | SYSTOLIC BLOOD PRESSURE: 156 MMHG | OXYGEN SATURATION: 98 % | RESPIRATION RATE: 17 BRPM | HEART RATE: 55 BPM

## 2020-08-27 DIAGNOSIS — K76.0 FATTY LIVER: Primary | ICD-10-CM

## 2020-08-27 NOTE — PROGRESS NOTES
[address] [place] [address], Basilio VELAZQUEZ Ninette Ali, MD Joneen Cheers, HERBIE Perez, [facility]     April S Salvador, [facility]   DENISE Andrews-RYLEE Ac, [facility]       Amira Frye [address] [place] 136    at [address], [address] [place] [address], [facility] 22.    [phone]    FAX: [address] [place] [address]    at [address], [address], [address]    [phone]    FAX: [phone]       Patient Care Team:  Nick Skinner MD as PCP - General (Internal Medicine)  Lakeisha Perez DPM (Podiatry)  Laila Rossi MD (Nephrology)  Flora Pelletier NP (Nurse Practitioner)  Elizabeth Barrientos MD (Ophthalmology)  Katei Mishra MD (Gastroenterology)      Problem List  Date Reviewed: 12/25/2019          Codes Class Noted    Essential hypertension ICD-10-CM: I10  ICD-9-CM: 401.9  8/19/2019        DM type 2 (diabetes mellitus, type 2) ([facility] ICD-10-CM: E11.9  ICD-9-CM: 250.00  8/19/2019        Elevated liver enzymes ICD-10-CM: R74.8  ICD-9-CM: 790.5  8/19/2019              Radha Alvarado returns to the [facility] for management of elevated liver enzymes. The active problem list, all pertinent past medical history, medications, radiologic findings and laboratory findings related to the liver disorder were reviewed with the patient. The patient is a 78 y.o.  female who was found to have elevated liver enzymes in 2017. Serologic evaluation for markers of chronic liver disease were positive for JASS. The most recent imaging of the liver was Ultrasound performed in 9/2019. Results suggest fatty liver disease. Assessment of liver fibrosis was performed with Fibroscan at today's office visit.  The result was E mean 6.4 kPa which correlates with stage 1 portal fibrosis. The patient had not started any new medications within 3 months preceding the elevation in liver chemistries. The patient has no symptoms which can be attributed to the liver disorder. The patient has not experienced the following symptoms: fatigue, pain in the right side over the liver, yellowing of the eyes or skin,     The patient completes all daily activities without any functional limitations. ASSESSMENT AND PLAN:  Elevated liver enzymes  Persistent elevation in liver transaminases these are now normal. ALP is normal.  Liver function is normal.  The platelet count is normal.      Serologic testing for causes of chronic liver disease were positive for JASS    The most likely causes for the liver chemistry abnormalities were discussed with the patient and include fatty liver disease, immune liver disorders. If the patient loses 20% of current body weight, which is 38 pounds, down to a weight of 160 pounds, all steatosis will have resolved. Once all steatosis has resolved all inflammation will resolve. Then all fibrosis will gradually resolve and the liver could eventually be normal.    If weight loss is successful hepatic steatosis will resolve and liver enzymes should return to the normal range. The Fibroscan can be repeated annually or as often as clinically indicated to assess for progression or regression of fibrosis. Since a liver biopsy was not performed it is possible the patient may not have fatty liver or this may not be contributing to the elevation in liver enzymes. If liver enzymes do not return to normal with weight reduction then additional evaluation including liver biopsy may be necessary to determine if the elevated liver enzymes is due to another etiology.     Counseling for diet and weight loss in patients with confirmed or suspected NAFLD  The patient was counseled regarding diet and exercise to achieve weight loss. The best diet for patients with fatty liver is one very low in carbohydrates and enriched with protein such as an Edith's program.      The patient was told not to consume any food products and drinks containing fructose as this enhances hepatic fat synthesis. There is no medication or vitamin supplements that we advocate for LAGOS. Using glitazones in patients without diabetes mellitus has been shown to reduce fat content in the liver but has no effect on fibrosis and is associated with weight gain. Vitamin E has also been used but the data is not very good and most experts no longer advocate this. Positive serology for autoimmune liver disease  The positive JASS suggests that there may be an underlying autoimmune liver disease. Given that the liver enzymes have returned to normal it is unlikely there is an autoimmune liver disorder. Will continue to monitor to see if the liver enzymes remain normal, fluctuate or become persistently elevated. Screening for Hepatocellular Carcinoma  HCC screening is not necessary if the patient has no evidence of cirrhosis. Treatment of other medical problems in patients with chronic liver disease  There are no contraindications for the patient to take most medications that are necessary for treatment of other medical issues. Counseling for alcohol in patients with chronic liver disease  The patient was counseled regarding alcohol consumption and the effect of alcohol on chronic liver disease. The patient does not consume any significant amount of alcohol. Vaccinations   Vaccination for viral hepatitis A is not needed. The patient has serologic evidence of prior exposure or vaccination with immunity. Routine vaccinations against other bacterial and viral agents can be performed as indicated. Annual flu vaccination should be administered if indicated.       ALLERGIES  No Known Allergies    MEDICATIONS  Current Outpatient Medications   Medication Sig    losartan-hydroCHLOROthiazide (HYZAAR) 100-12.5 mg per tablet Take 1 Tab by mouth daily.  insulin NPH/insulin regular (NOVOLIN 70/30 U-100 INSULIN) 100 unit/mL (70-30) injection by SubCUTAneous route.  iron fum,lf-V-Z73-FA-Ca-succ (MULTIGEN PLUS) tablet Take 1 Tab by mouth daily.  brimonidine-timolol (COMBIGAN) 0.2-0.5 % drop ophthalmic solution 1 Drop every twelve (12) hours.  CALCIUM OYSTER SHELL PO Take  by mouth.  cholecalciferol (VITAMIN D3) 1,000 unit tablet Take  by mouth daily.  allopurinol (ZYLOPRIM) 300 mg tablet Take  by mouth daily.  lovastatin (MEVACOR) 20 mg tablet Take 20 mg by mouth nightly.  aspirin delayed-release 81 mg tablet Take  by mouth daily.  co-enzyme Q-10 (COQ-10) 100 mg capsule Take 50 mg by mouth daily. No current facility-administered medications for this visit. SYSTEM REVIEW NOT RELATED TO LIVER DISEASE OR REVIEWED ABOVE:  Constitution systems: Negative for fever, chills, weight gain, weight loss. Eyes: Negative for visual changes. ENT: Negative for sore throat, painful swallowing. Respiratory: Negative for cough, hemoptysis, SOB. Cardiology: Negative for chest pain, palpitations. GI:  Negative for constipation or diarrhea. : Negative for urinary frequency, dysuria, hematuria, nocturia. Skin: Negative for rash. Hematology: Negative for easy bruising, blood clots. Musculo-skelatal: Negative for back pain, muscle pain, weakness. Neurologic: Negative for headaches, dizziness, vertigo, memory problems not related to HE. Psychology: Negative for anxiety, depression. FAMILY HISTORY:  The father is . The mother is . There is no family history of liver disease. There is no family history of immune disorders. SOCIAL HISTORY:  The patient is . The patient has 3 children, 4 grandchildren. 3 great grand children. The patient has never used tobacco products.     The patient does not drink alcohol   The patient is retired. PHYSICAL EXAMINATION:  Visit Vitals  /76 (BP 1 Location: Right arm, BP Patient Position: Sitting)   Pulse (!) 55   Temp 98.2 °F (36.8 °C)   Resp 17   Ht 5' 5\" (1.651 m)   Wt 189 lb 12.8 oz (86.1 kg)   SpO2 98%   BMI 31.58 kg/m²       General: No acute distress. Eyes: Sclera anicteric. ENT: No oral lesions. Thyroid normal.  Nodes: No adenopathy. Skin: No spider angiomata. No jaundice. No palmar erythema. Respiratory: Lungs clear to auscultation. Cardiovascular: Regular heart rate. No murmurs. No JVD. Abdomen: Soft non-tender. Liver size normal to percussion/palpation. Spleen not palpable. No obvious ascites. Extremities: No edema. No muscle wasting. No gross arthritic changes. Neurologic: Alert and oriented. Cranial nerves grossly intact. No asterixis.     LABORATORY STUDIES:  Liver [place] of [address] & Units 2/27/2020   WBC 4.6 - 13.2 K/uL 7.0   ANC 1.8 - 8.0 K/UL 3.3   HGB 12.0 - 16.0 g/dL 11.3 (L)    - 420 K/uL 249   AST 10 - 38 U/L 33   ALT 13 - 56 U/L 56   Alk Phos 45 - 117 U/L 86   Bili, Total 0.2 - 1.0 MG/DL 0.4   Bili, Direct 0.0 - 0.2 MG/DL <0.1   Albumin 3.4 - 5.0 g/dL 3.3 (L)   BUN 7.0 - 18 MG/DL 46 (H)   Creat 0.6 - 1.3 MG/DL 1.18   Na 136 - 145 mmol/L 142   K 3.5 - 5.5 mmol/L 4.2   Cl 100 - 111 mmol/L 106   CO2 21 - 32 mmol/L 31   Glucose 74 - 99 mg/dL 136 (H)     From 7/2020  AST/ALT/ALP/T Bili/ALB: 35/40/69/0.5/4.0  WBC/HB/PLT:5.8/11.4/244  NA/BUN/CREAT: 141/42/1.2    SEROLOGIES:  Serologies Latest Ref Rng & Units 8/19/2019   Hep A Ab, Total NEGATIVE   Positive (A)   Hep B Surface Ag <1.00 Index 0.21   Hep B Surface Ag Interp NEG   NEGATIVE   Hep B Core Ab, Total NEGATIVE   NEGATIVE   Hep B Surface Ab >10.0 mIU/mL <3.10 (L)   Hep B Surface Ab Interp POS   NEGATIVE (A)   Ferritin 8 - 388 NG/ (H)   Iron % Saturation % 26   JASS, IFA  Positive (A)   JASS Pattern  1:320 (H)   JASS Pattern 1:640 (H)   C-ANCA Neg:<1:20 titer <1:20   P-ANCA Neg:<1:20 titer <1:20   ANCA Neg:<1:20 titer <1:20   ASMCA 0 - 19 Units 6   M2 Ab 0.0 - 20.0 Units <20.0   Anti-SLA 0.0 - 20.0 units 0.8   Alpha-1 antitrypsin level 90 - 200 mg/dL 112     LIVER HISTOLOGY:  9/2019. Shear wave elastography performed at [facility]. E Range: 4.18-8.51 kPa, E Mean: 6.25 kPa, E Median: 5.93 kPa, E Std: 1.47 kPa. The results suggested a fibrosis level of F1.  2/2020. [facility]. EkPa was 6.4. IQR/med 17%. . The results suggested a fibrosis level of F1. The CAP score suggests fatty liver    ENDOSCOPIC PROCEDURES:  Not available or performed    RADIOLOGY:  8/2017. Ultrasound of liver. Echogenic consistent with fatty liver. No liver mass lesions. No dilated bile ducts. No ascites. 9/2019. Ultrasound of liver. Echogenic consistent with fatty liver. No liver mass lesions. No dilated bile ducts. No ascites. OTHER TESTING:  Not available or performed    FOLLOW-UP:  All of the issues listed above in the Assessment and Plan were discussed with the patient. All questions were answered. The patient expressed a clear understanding of the above. If liver enzymes normalize and/or the elastography again shows no liver injury then no further follow up is warranted. [address] [address] in 6 months to assess for the effects of diet changes and weight loss. Fibroscan at next visit.        Alexa Guzman, AGPCNP-BC  [address] Liver [place] [facility]  [address], [address] [address]  98 Danica Staley, [address]  [phone]

## 2021-03-02 ENCOUNTER — OFFICE VISIT (OUTPATIENT)
Dept: HEMATOLOGY | Age: 71
End: 2021-03-02
Payer: MEDICARE

## 2021-03-02 ENCOUNTER — HOSPITAL ENCOUNTER (OUTPATIENT)
Dept: LAB | Age: 71
Discharge: HOME | End: 2021-03-02
Payer: MEDICARE

## 2021-03-02 VITALS
RESPIRATION RATE: 17 BRPM | DIASTOLIC BLOOD PRESSURE: 65 MMHG | HEIGHT: 65 IN | HEART RATE: 60 BPM | TEMPERATURE: 97.8 F | WEIGHT: 202 LBS | OXYGEN SATURATION: 97 % | BODY MASS INDEX: 33.66 KG/M2 | SYSTOLIC BLOOD PRESSURE: 153 MMHG

## 2021-03-02 DIAGNOSIS — R74.8 ELEVATED LIVER ENZYMES: ICD-10-CM

## 2021-03-02 DIAGNOSIS — R74.8 ELEVATED LIVER ENZYMES: Primary | ICD-10-CM

## 2021-03-02 LAB
ALBUMIN SERPL-MCNC: 3.2 G/DL (ref 3.4–5)
ALBUMIN/GLOB SERPL: 0.8 {RATIO} (ref 0.8–1.7)
ALP SERPL-CCNC: 80 U/L (ref 45–117)
ALT SERPL-CCNC: 56 U/L (ref 13–56)
AST SERPL-CCNC: 37 U/L (ref 10–38)
BILIRUB DIRECT SERPL-MCNC: 0.1 MG/DL (ref 0–0.2)
BILIRUB SERPL-MCNC: 0.4 MG/DL (ref 0.2–1)
GLOBULIN SER CALC-MCNC: 3.9 G/DL (ref 2–4)
PROT SERPL-MCNC: 7.1 G/DL (ref 6.4–8.2)

## 2021-03-02 PROCEDURE — 99213 OFFICE O/P EST LOW 20 MIN: CPT | Performed by: NURSE PRACTITIONER

## 2021-03-02 PROCEDURE — 1090F PRES/ABSN URINE INCON ASSESS: CPT | Performed by: NURSE PRACTITIONER

## 2021-03-02 PROCEDURE — G8754 DIAS BP LESS 90: HCPCS | Performed by: NURSE PRACTITIONER

## 2021-03-02 PROCEDURE — G8427 DOCREV CUR MEDS BY ELIG CLIN: HCPCS | Performed by: NURSE PRACTITIONER

## 2021-03-02 PROCEDURE — G8536 NO DOC ELDER MAL SCRN: HCPCS | Performed by: NURSE PRACTITIONER

## 2021-03-02 PROCEDURE — G8400 PT W/DXA NO RESULTS DOC: HCPCS | Performed by: NURSE PRACTITIONER

## 2021-03-02 PROCEDURE — G8417 CALC BMI ABV UP PARAM F/U: HCPCS | Performed by: NURSE PRACTITIONER

## 2021-03-02 PROCEDURE — G8753 SYS BP > OR = 140: HCPCS | Performed by: NURSE PRACTITIONER

## 2021-03-02 PROCEDURE — 1101F PT FALLS ASSESS-DOCD LE1/YR: CPT | Performed by: NURSE PRACTITIONER

## 2021-03-02 PROCEDURE — G0463 HOSPITAL OUTPT CLINIC VISIT: HCPCS | Performed by: NURSE PRACTITIONER

## 2021-03-02 PROCEDURE — 3017F COLORECTAL CA SCREEN DOC REV: CPT | Performed by: NURSE PRACTITIONER

## 2021-03-02 PROCEDURE — 80076 HEPATIC FUNCTION PANEL: CPT

## 2021-03-02 PROCEDURE — G8432 DEP SCR NOT DOC, RNG: HCPCS | Performed by: NURSE PRACTITIONER

## 2021-03-02 PROCEDURE — 36415 COLL VENOUS BLD VENIPUNCTURE: CPT

## 2021-03-02 RX ORDER — AMLODIPINE BESYLATE 5 MG/1
5 TABLET ORAL DAILY
COMMUNITY

## 2021-03-02 NOTE — PROGRESS NOTES
Yasmin Ashlee [address] [facility]      Yael Mera MD, Eve Nelson, Villa Summers MD, MPH      HERBIE Pelayo, [facility]     Tawny Franco, [facility]   Jade Roberts P-RYLEE    Tessa Gonzalez, [facility]       Amira Frye Alan [address] Allen 136    at [address], [address] [place] [address], [facility] 22.    [phone]    FAX: [address] [place] [address], [address]    [phone]    FAX: [phone]       Patient Care Team:  Rajesh Torres MD as PCP - General (Internal Medicine)  Joana Dandy, DPM (Podiatry)  Jared Gaviria MD (Nephrology)  Guerline Ye NP (Nurse Practitioner)  Lucille Welch MD (Ophthalmology)  Yenny Tee MD (Gastroenterology)      Problem List  Date Reviewed: 8/28/2020          Codes Class Noted    Essential hypertension ICD-10-CM: I10  ICD-9-CM: 401.9  8/19/2019        DM type 2 (diabetes mellitus, type 2) ([facility] ICD-10-CM: E11.9  ICD-9-CM: 250.00  8/19/2019        Elevated liver enzymes ICD-10-CM: R74.8  ICD-9-CM: 790.5  8/19/2019              Sarai Durant returns to the [address] for management of elevated liver enzymes. The active problem list, all pertinent past medical history, medications, radiologic findings and laboratory findings related to the liver disorder were reviewed with the patient. The patient is a 73 y.o.  female who was found to have elevated liver enzymes in 2017. Serologic evaluation for markers of chronic liver disease were positive for JASS. The most recent imaging of the liver was Ultrasound performed in 9/2019. Results suggest fatty liver disease. Assessment of liver fibrosis with Fibroscan has not been performed.      The patient has no symptoms which can be attributed to the liver disorder. The patient has not experienced the following symptoms: fatigue, pain in the right side over the liver, yellowing of the eyes or skin,     The patient completes all daily activities without any functional limitations. ASSESSMENT AND PLAN:  Elevated liver enzymes  Persistent elevation in liver transaminases these are now normal. ALP is normal.  Liver function is normal.  The platelet count is normal.      Serologic testing for causes of chronic liver disease were positive for JASS    The most likely causes for the liver chemistry abnormalities were discussed with the patient and include fatty liver disease, immune liver disorders. If the patient loses 20% of current body weight, which is 38 pounds, down to a weight of 160 pounds, all steatosis will have resolved. Once all steatosis has resolved all inflammation will resolve. Then all fibrosis will gradually resolve and the liver could eventually be normal.    If weight loss is successful hepatic steatosis will resolve and liver enzymes should return to the normal range. The Fibroscan can be repeated annually or as often as clinically indicated to assess for progression or regression of fibrosis. Since a liver biopsy was not performed it is possible the patient may not have fatty liver or this may not be contributing to the elevation in liver enzymes. If liver enzymes do not return to normal with weight reduction then additional evaluation including liver biopsy may be necessary to determine if the elevated liver enzymes is due to another etiology. Counseling for diet and weight loss in patients with confirmed or suspected NAFLD  The patient was counseled regarding diet and exercise to achieve weight loss.  The best diet for patients with fatty liver is one very low in carbohydrates and enriched with protein such as an Edith's program.      The patient was told not to consume any food products and drinks containing fructose as this enhances hepatic fat synthesis. There is no medication or vitamin supplements that we advocate for LAGOS. Using glitazones in patients without diabetes mellitus has been shown to reduce fat content in the liver but has no effect on fibrosis and is associated with weight gain. Vitamin E has also been used but the data is not very good and most experts no longer advocate this. Positive serology for autoimmune liver disease  The positive JASS suggests that there may be an underlying autoimmune liver disease. Given that the liver enzymes have returned to normal it is unlikely there is an autoimmune liver disorder. Will continue to monitor to see if the liver enzymes remain normal, fluctuate or become persistently elevated. Screening for Hepatocellular Carcinoma  HCC screening is not necessary if the patient has no evidence of cirrhosis. Treatment of other medical problems in patients with chronic liver disease  There are no contraindications for the patient to take most medications that are necessary for treatment of other medical issues. Counseling for alcohol in patients with chronic liver disease  The patient was counseled regarding alcohol consumption and the effect of alcohol on chronic liver disease. The patient does not consume any significant amount of alcohol. Vaccinations   Vaccination for viral hepatitis A is not needed. The patient has serologic evidence of prior exposure or vaccination with immunity. Routine vaccinations against other bacterial and viral agents can be performed as indicated. Annual flu vaccination should be administered if indicated. ALLERGIES  No Known Allergies    MEDICATIONS  Current Outpatient Medications   Medication Sig    amLODIPine (NORVASC) 5 mg tablet Take 5 mg by mouth daily.  losartan-hydroCHLOROthiazide (HYZAAR) 100-12.5 mg per tablet Take 1 Tab by mouth daily.     insulin NPH/insulin regular (NOVOLIN 70/30 U-100 INSULIN) 100 unit/mL (70-30) injection by SubCUTAneous route.  iron fum,fx-[id number]-[id number]-Ca-succ (MULTIGEN PLUS) tablet Take 1 Tab by mouth daily.  brimonidine-timolol (COMBIGAN) 0.2-0.5 % drop ophthalmic solution 1 Drop every twelve (12) hours.  CALCIUM OYSTER SHELL PO Take  by mouth.  cholecalciferol (VITAMIN D3) 1,000 unit tablet Take  by mouth daily.  allopurinol (ZYLOPRIM) 300 mg tablet Take  by mouth daily.  lovastatin (MEVACOR) 20 mg tablet Take 20 mg by mouth nightly.  aspirin delayed-release 81 mg tablet Take  by mouth daily.  co-enzyme Q-10 (COQ-10) 100 mg capsule Take 50 mg by mouth daily. No current facility-administered medications for this visit. SYSTEM REVIEW NOT RELATED TO LIVER DISEASE OR REVIEWED ABOVE:  Constitution systems: Negative for fever, chills, weight gain, weight loss. Eyes: Negative for visual changes. ENT: Negative for sore throat, painful swallowing. Respiratory: Negative for cough, hemoptysis, SOB. Cardiology: Negative for chest pain, palpitations. GI:  Negative for constipation or diarrhea. : Negative for urinary frequency, dysuria, hematuria, nocturia. Skin: Negative for rash. Hematology: Negative for easy bruising, blood clots. Musculo-skelatal: Negative for back pain, muscle pain, weakness. Neurologic: Negative for headaches, dizziness, vertigo, memory problems not related to HE. Psychology: Negative for anxiety, depression. FAMILY HISTORY:  The father is . The mother is . There is no family history of liver disease. There is no family history of immune disorders. SOCIAL HISTORY:  The patient is . The patient has 3 children, 4 grandchildren. 3 great grand children. The patient has never used tobacco products. The patient does not drink alcohol   The patient is retired.        PHYSICAL EXAMINATION:  Visit Vitals  BP (!) 162/72 (BP 1 Location: Right arm, BP Patient Position: Sitting, BP Cuff Size: Large adult)   Pulse 60   Temp 97.8 °F (36.6 °C)   Resp 17   Ht 5' 5\" (1.651 m)   Wt 202 lb (91.6 kg)   SpO2 97%   BMI 33.61 kg/m²       General: No acute distress. Eyes: Sclera anicteric. ENT: No oral lesions. Thyroid normal.  Nodes: No adenopathy. Skin: No spider angiomata. No jaundice. No palmar erythema. Respiratory: Lungs clear to auscultation. Cardiovascular: Regular heart rate. No murmurs. No JVD. Abdomen: Soft non-tender. Liver size normal to percussion/palpation. Spleen not palpable. No obvious ascites. Extremities: No edema. No muscle wasting. No gross arthritic changes. Neurologic: Alert and oriented. Cranial nerves grossly intact. No asterixis. LABORATORY STUDIES:  Liver [place] of [id number] & Units 3/2/2021   AST 10 - 38 U/L 37   ALT 13 - 56 U/L 56   Alk Phos 45 - 117 U/L 80   Bili, Total 0.2 - 1.0 MG/DL 0.4   Bili, Direct 0.0 - 0.2 MG/DL 0.1   Albumin 3.4 - 5.0 g/dL 3.2 (L)       SEROLOGIES:  Serologies Latest Ref Rng & Units 8/19/2019   Hep A Ab, Total NEGATIVE   Positive (A)   Hep B Surface Ag <1.00 Index 0.21   Hep B Surface Ag Interp NEG   NEGATIVE   Hep B Core Ab, Total NEGATIVE   NEGATIVE   Hep B Surface Ab >10.0 mIU/mL <3.10 (L)   Hep B Surface Ab Interp POS   NEGATIVE (A)   Ferritin 8 - 388 NG/ (H)   Iron % Saturation % 26   JASS, IFA  Positive (A)   JASS Pattern  1:320 (H)   JASS Pattern  1:640 (H)   C-ANCA Neg:<1:20 titer <1:20   P-ANCA Neg:<1:20 titer <1:20   ANCA Neg:<1:20 titer <1:20   ASMCA 0 - 19 Units 6   M2 Ab 0.0 - 20.0 Units <20.0   Anti-SLA 0.0 - 20.0 units 0.8   Alpha-1 antitrypsin level 90 - 200 mg/dL 112     LIVER HISTOLOGY:  9/2019. Shear wave elastography performed at THE [facility]. E Range: 4.18-8.51 kPa, E Mean: 6.25 kPa, E Median: 5.93 kPa, E Std: 1.47 kPa. The results suggested a fibrosis level of F1.  2/2020. [facility]. EkPa was 6.4. IQR/med 17%. . The results suggested a fibrosis level of F1. The CAP score suggests fatty liver    ENDOSCOPIC PROCEDURES:  Not available or performed    RADIOLOGY:  8/2017. Ultrasound of liver. Echogenic consistent with fatty liver. No liver mass lesions. No dilated bile ducts. No ascites. 9/2019. Ultrasound of liver. Echogenic consistent with fatty liver. No liver mass lesions. No dilated bile ducts. No ascites. OTHER TESTING:  Not available or performed    FOLLOW-UP:  All of the issues listed above in the Assessment and Plan were discussed with the patient. All questions were answered. The patient expressed a clear understanding of the above. If liver enzymes normalize and/or the elastography again shows no liver injury then no further follow up is warranted. [address] [address] in 4 weeks. Fibroscan at next visit.          Adonay Daphne, AGPCNP-BC  [address] [facility] [place] [address], [facility] [address]  98 [facility], [address] [phone]

## 2021-03-03 PROBLEM — R74.8 ELEVATED LIVER ENZYMES: Status: RESOLVED | Noted: 2019-08-19 | Resolved: 2021-03-03

## 2021-03-03 PROBLEM — K76.0 FATTY LIVER: Status: ACTIVE | Noted: 2021-03-03

## 2021-04-08 ENCOUNTER — OFFICE VISIT (OUTPATIENT)
Dept: HEMATOLOGY | Age: 71
End: 2021-04-08
Payer: MEDICARE

## 2021-04-08 VITALS
WEIGHT: 197 LBS | RESPIRATION RATE: 17 BRPM | SYSTOLIC BLOOD PRESSURE: 165 MMHG | TEMPERATURE: 98 F | HEIGHT: 65 IN | OXYGEN SATURATION: 98 % | BODY MASS INDEX: 32.82 KG/M2 | DIASTOLIC BLOOD PRESSURE: 56 MMHG | HEART RATE: 82 BPM

## 2021-04-08 DIAGNOSIS — K76.0 FATTY LIVER: Primary | ICD-10-CM

## 2021-04-08 PROCEDURE — 91200 LIVER ELASTOGRAPHY: CPT | Performed by: NURSE PRACTITIONER

## 2021-04-08 PROCEDURE — 99213 OFFICE O/P EST LOW 20 MIN: CPT | Performed by: NURSE PRACTITIONER

## 2021-04-08 PROCEDURE — G8417 CALC BMI ABV UP PARAM F/U: HCPCS | Performed by: NURSE PRACTITIONER

## 2021-04-08 PROCEDURE — 1090F PRES/ABSN URINE INCON ASSESS: CPT | Performed by: NURSE PRACTITIONER

## 2021-04-08 PROCEDURE — G0463 HOSPITAL OUTPT CLINIC VISIT: HCPCS | Performed by: NURSE PRACTITIONER

## 2021-04-08 PROCEDURE — G8753 SYS BP > OR = 140: HCPCS | Performed by: NURSE PRACTITIONER

## 2021-04-08 PROCEDURE — G8536 NO DOC ELDER MAL SCRN: HCPCS | Performed by: NURSE PRACTITIONER

## 2021-04-08 PROCEDURE — G8400 PT W/DXA NO RESULTS DOC: HCPCS | Performed by: NURSE PRACTITIONER

## 2021-04-08 PROCEDURE — 1101F PT FALLS ASSESS-DOCD LE1/YR: CPT | Performed by: NURSE PRACTITIONER

## 2021-04-08 PROCEDURE — G8432 DEP SCR NOT DOC, RNG: HCPCS | Performed by: NURSE PRACTITIONER

## 2021-04-08 PROCEDURE — G8427 DOCREV CUR MEDS BY ELIG CLIN: HCPCS | Performed by: NURSE PRACTITIONER

## 2021-04-08 PROCEDURE — G8754 DIAS BP LESS 90: HCPCS | Performed by: NURSE PRACTITIONER

## 2021-04-08 PROCEDURE — 3017F COLORECTAL CA SCREEN DOC REV: CPT | Performed by: NURSE PRACTITIONER

## 2021-04-08 NOTE — PROGRESS NOTES
[facility] [address] [facility] [address]      Yamileth Ceballos MD, Peng Current, Andie Lombardo MD, MPH      LILLIAN Santiago-RYLEE Duncan, ACNP-BC     Tawny Franco, [facility]   Golden Belloyesenia, FNP-C    Margarito Pastor, [facility]       Amira Deputado [address] 136    at [address], [address] [place] [address]    [address] [address].    [phone]    FAX: [address] [place] [address], [address]    [phone]    FAX: [phone]       Patient Care Team:  George Forte MD as PCP - General (Internal Medicine)  Ada Marcos DPM (Podiatry)  Krystle Hernandez MD (Nephrology)  Macie Chávez NP (Nurse Practitioner)  Agatha Smith MD (Ophthalmology)  Ej Tao MD (Gastroenterology)      Problem List  Date Reviewed: 3/3/2021          Codes Class Noted    Fatty liver ICD-10-CM: K76.0  ICD-9-CM: 571.8  3/3/2021        Essential hypertension ICD-10-CM: I10  ICD-9-CM: 401.9  8/19/2019        DM type 2 (diabetes mellitus, type 2) ([facility] ICD-10-CM: E11.9  ICD-9-CM: 250.00  8/19/2019              Vinicius Dutton returns to the [address] for management of suspected fatty liver. The active problem list, all pertinent past medical history, medications, radiologic findings and laboratory findings related to the liver disorder were reviewed with the patient. The patient is a 73 y.o.  female who was found to have elevated liver enzymes in 2017. Serologic evaluation for markers of chronic liver disease were positive for JASS. The most recent imaging of the liver was Ultrasound performed in 9/2019. Results suggest fatty liver disease. Assessment of liver fibrosis with Fibroscan was performed in the office today.  The result was 2.9 kPa which correlates with no fibrosis. The CAP score of 286 suggests hepatic steatosis. The patient has no symptoms which can be attributed to the liver disorder. The patient has not experienced the following symptoms: fatigue, pain in the right side over the liver, yellowing of the eyes or skin,     The patient completes all daily activities without any functional limitations. ASSESSMENT AND PLAN:  Fatty liver    Suspect the patient has fatty liver based upon imaging, Fiboscan CAP score, features of metabolic syndrome,   serologic studies that are negative for other causes of chronic liver disease. The histologic severity has not been defined. Elastography performed in 4/2021 suggests no fibrosis    NAFL is a benign form of fatty liver disease and not thought to progress to fibrosis or cirrhosis. Liver transaminases are normal. ALP is normal. Liver function is normal. The platelet count is normal.      Based upon laboratory studies, Fibroscan, and imaging  the patient does not appear to have significant liver injury. Have performed laboratory testing to monitor liver function and degree of liver injury. This included BMP,   hepatic panel, CBC with platelet count. Serologic testing for causes of chronic liver disease was positive for JASS     Only a liver biopsy can confirm if the patient does have fatty liver and differentiate NAFL from LAGOS. The treatment is the same; weight reduction. If the liver biopsy demonstrates LAGOS the patient could be eligible for enrollment into clinical trials for treatment of LAGOS. There is no reason to perform liver biopsy at this time. Liver chemistries will be monitored. If the patient loses 20% of current body weight, which is 38 pounds, down to a weight of 159 pounds, all steatosis will have resolved. There is currently no FDA approved medical treatment for fatty liver, NALFD or LAGOS.   The only medical treatments for LAGOS are through clinical trials. The patient will be monitored at periodic intervals. If weight loss is successful hepatic steatosis will resolve and liver enzymes should return to the normal range. The Fibroscan can be repeated annually or as often as clinically indicated to assess for progression or regression of fibrosis. Since a liver biopsy was not performed it is possible the patient may not have fatty liver or this may not be contributing to the elevation in liver enzymes. Counseling for diet and weight loss in patients with confirmed or suspected NAFLD  The patient was counseled regarding diet and exercise to achieve weight loss. The best diet for patients with fatty liver is one very low in carbohydrates and enriched with protein such as an Edith's program.      The patient was told not to consume any food products and drinks containing fructose as this enhances hepatic fat synthesis. There is no medication or vitamin supplements that we advocate for LAGOS. Using glitazones in patients without diabetes mellitus has been shown to reduce fat content in the liver but has no effect on fibrosis and is associated with weight gain. Vitamin E has also been used but the data is not very good and most experts no longer advocate this. Positive serology for autoimmune liver disease  The positive JASS suggests that there may be an underlying autoimmune liver disease. Given that the liver enzymes have returned to normal it is unlikely there is an autoimmune liver disorder. Will continue to monitor to see if the liver enzymes remain normal, fluctuate or become persistently elevated. Screening for Hepatocellular Carcinoma  HCC screening is not necessary if the patient has no evidence of cirrhosis.     Treatment of other medical problems in patients with chronic liver disease  There are no contraindications for the patient to take most medications that are necessary for treatment of other medical issues. Counseling for alcohol in patients with chronic liver disease  The patient was counseled regarding alcohol consumption and the effect of alcohol on chronic liver disease. The patient does not consume any significant amount of alcohol. Vaccinations   Vaccination for viral hepatitis B is recommended since the patient has no serologic evidence of previous exposure or vaccination with immunity. Vaccination for viral hepatitis A is not needed. The patient has serologic evidence of prior exposure or vaccination with immunity. Routine vaccinations against other bacterial and viral agents can be performed as indicated. Annual flu vaccination should be administered if indicated. ALLERGIES  No Known Allergies    MEDICATIONS  Current Outpatient Medications   Medication Sig    amLODIPine (NORVASC) 5 mg tablet Take 5 mg by mouth daily.  losartan-hydroCHLOROthiazide (HYZAAR) 100-12.5 mg per tablet Take 1 Tab by mouth daily.  insulin NPH/insulin regular (NOVOLIN 70/30 U-100 INSULIN) 100 unit/mL (70-30) injection by SubCUTAneous route.  iron fum,kb-[id number]-[id number]-Ca-succ (MULTIGEN PLUS) tablet Take 1 Tab by mouth daily.  brimonidine-timolol (COMBIGAN) 0.2-0.5 % drop ophthalmic solution 1 Drop every twelve (12) hours.  CALCIUM OYSTER SHELL PO Take  by mouth.  cholecalciferol (VITAMIN D3) 1,000 unit tablet Take  by mouth daily.  allopurinol (ZYLOPRIM) 300 mg tablet Take  by mouth daily.  lovastatin (MEVACOR) 20 mg tablet Take 20 mg by mouth nightly.  aspirin delayed-release 81 mg tablet Take  by mouth daily.  co-enzyme Q-10 (COQ-10) 100 mg capsule Take 50 mg by mouth daily. No current facility-administered medications for this visit. SYSTEM REVIEW NOT RELATED TO LIVER DISEASE OR REVIEWED ABOVE:  Constitution systems: Negative for fever, chills, weight gain, weight loss. Eyes: Negative for visual changes. ENT: Negative for sore throat, painful swallowing.    Respiratory: Negative for cough, hemoptysis, SOB. Cardiology: Negative for chest pain, palpitations. GI:  Negative for constipation or diarrhea. : Negative for urinary frequency, dysuria, hematuria, nocturia. Skin: Negative for rash. Hematology: Negative for easy bruising, blood clots. Musculo-skelatal: Negative for back pain, muscle pain, weakness. Neurologic: Negative for headaches, dizziness, vertigo, memory problems not related to HE. Psychology: Negative for anxiety, depression. FAMILY HISTORY:  The father is . The mother is . There is no family history of liver disease. There is no family history of immune disorders. SOCIAL HISTORY:  The patient is . The patient has 3 children, 4 grandchildren. 3 great grand children. The patient has never used tobacco products. The patient does not drink alcohol   The patient is retired. PHYSICAL EXAMINATION:  Visit Vitals  BP (!) 165/56   Pulse 82   Temp 98 °F (36.7 °C)   Resp 17   Ht 5' 5\" (1.651 m)   Wt 197 lb (89.4 kg)   SpO2 98%   BMI 32.78 kg/m²       General: No acute distress. Eyes: Sclera anicteric. ENT: No oral lesions. Thyroid normal.  Nodes: No adenopathy. Skin: No spider angiomata. No jaundice. No palmar erythema. Respiratory: Lungs clear to auscultation. Cardiovascular: Regular heart rate. No murmurs. No JVD. Abdomen: Soft non-tender. Liver size normal to percussion/palpation. Spleen not palpable. No obvious ascites. Extremities: No edema. No muscle wasting. No gross arthritic changes. Neurologic: Alert and oriented. Cranial nerves grossly intact. No asterixis.     LABORATORY STUDIES:  Liver [place] of [address] & Units 3/2/2021   AST 10 - 38 U/L 37   ALT 13 - 56 U/L 56   Alk Phos 45 - 117 U/L 80   Bili, Total 0.2 - 1.0 MG/DL 0.4   Bili, Direct 0.0 - 0.2 MG/DL 0.1   Albumin 3.4 - 5.0 g/dL 3.2 (L)       SEROLOGIES:  Serologies Latest Ref Rng & Units 2019   Hep A Ab, Total NEGATIVE   Positive (A)   Hep B Surface Ag <1.00 Index 0.21   Hep B Surface Ag Interp NEG   NEGATIVE   Hep B Core Ab, Total NEGATIVE   NEGATIVE   Hep B Surface Ab >10.0 mIU/mL <3.10 (L)   Hep B Surface Ab Interp POS   NEGATIVE (A)   Ferritin 8 - 388 NG/ (H)   Iron % Saturation % 26   JASS, IFA  Positive (A)   JASS Pattern  1:320 (H)   JASS Pattern  1:640 (H)   C-ANCA Neg:<1:20 titer <1:20   P-ANCA Neg:<1:20 titer <1:20   ANCA Neg:<1:20 titer <1:20   ASMCA 0 - 19 Units 6   M2 Ab 0.0 - 20.0 Units <20.0   Anti-SLA 0.0 - 20.0 units 0.8   Alpha-1 antitrypsin level 90 - 200 mg/dL 112     LIVER HISTOLOGY:  9/2019. Shear wave elastography performed at [facility]. E Range: 4.18-8.51 kPa, E Mean: 6.25 kPa, E Median: 5.93 kPa, E Std: 1.47 kPa. The results suggested a fibrosis level of F1.  2/2020. FibroScan performed at [address]. EkPa was 6.4. IQR/med 17%. . The results suggested a fibrosis level of F1. The CAP score suggests fatty liver  4/2021. FibroScan performed at [address]. EkPa was 2.9. IQR/med 21%. . The results suggested a fibrosis level of F0. The CAP score suggests there is hepatic steatosis. ENDOSCOPIC PROCEDURES:  Not available or performed    RADIOLOGY:  8/2017. Ultrasound of liver. Echogenic consistent with fatty liver. No liver mass lesions. No dilated bile ducts. No ascites. 9/2019. Ultrasound of liver. Echogenic consistent with fatty liver. No liver mass lesions. No dilated bile ducts. No ascites. OTHER TESTING:  Not available or performed    FOLLOW-UP:  All of the issues listed above in the Assessment and Plan were discussed with the patient. All questions were answered. The patient expressed a clear understanding of the above. [facility] 87 in 6 months for monitoring. If liver enzymes normalize and/or the elastography again shows no liver injury then no further follow up is warranted.          Margarito Pastor AGPCNP-BC  [address] Liver [place] of [address] [place] [address] Road,[address]  4 [facility], [address]  98 Danica Staley, [address]  [phone]

## 2021-11-15 ENCOUNTER — OFFICE VISIT (OUTPATIENT)
Dept: HEMATOLOGY | Age: 71
End: 2021-11-15

## 2021-11-15 ENCOUNTER — OFFICE VISIT (OUTPATIENT)
Dept: HEMATOLOGY | Age: 71
End: 2021-11-15
Payer: MEDICARE

## 2021-11-15 ENCOUNTER — HOSPITAL ENCOUNTER (OUTPATIENT)
Dept: LAB | Age: 71
Discharge: HOME | End: 2021-11-15
Payer: MEDICARE

## 2021-11-15 VITALS — HEIGHT: 65 IN | BODY MASS INDEX: 32.78 KG/M2

## 2021-11-15 VITALS
BODY MASS INDEX: 31.32 KG/M2 | RESPIRATION RATE: 17 BRPM | OXYGEN SATURATION: 98 % | SYSTOLIC BLOOD PRESSURE: 162 MMHG | DIASTOLIC BLOOD PRESSURE: 73 MMHG | HEIGHT: 65 IN | HEART RATE: 69 BPM | TEMPERATURE: 98.1 F | WEIGHT: 188 LBS

## 2021-11-15 DIAGNOSIS — K76.0 FATTY LIVER: ICD-10-CM

## 2021-11-15 DIAGNOSIS — K76.0 FATTY LIVER: Primary | ICD-10-CM

## 2021-11-15 PROBLEM — C50.919 BREAST CANCER (HCC): Status: ACTIVE | Noted: 2021-11-15

## 2021-11-15 LAB
ALBUMIN SERPL-MCNC: 3.2 G/DL (ref 3.4–5)
ALBUMIN/GLOB SERPL: 0.8 {RATIO} (ref 0.8–1.7)
ALP SERPL-CCNC: 73 U/L (ref 45–117)
ALT SERPL-CCNC: 58 U/L (ref 13–56)
ANION GAP SERPL CALC-SCNC: 4 MMOL/L (ref 3–18)
AST SERPL-CCNC: 39 U/L (ref 10–38)
BASOPHILS # BLD: 0.1 K/UL (ref 0–0.1)
BASOPHILS NFR BLD: 1 % (ref 0–2)
BILIRUB DIRECT SERPL-MCNC: 0.1 MG/DL (ref 0–0.2)
BILIRUB SERPL-MCNC: 0.4 MG/DL (ref 0.2–1)
BUN SERPL-MCNC: 53 MG/DL (ref 7–18)
BUN/CREAT SERPL: 31 (ref 12–20)
CALCIUM SERPL-MCNC: 9.4 MG/DL (ref 8.5–10.1)
CHLORIDE SERPL-SCNC: 107 MMOL/L (ref 100–111)
CO2 SERPL-SCNC: 29 MMOL/L (ref 21–32)
CREAT SERPL-MCNC: 1.7 MG/DL (ref 0.6–1.3)
DIFFERENTIAL METHOD BLD: ABNORMAL
EOSINOPHIL # BLD: 0.6 K/UL (ref 0–0.4)
EOSINOPHIL NFR BLD: 9 % (ref 0–5)
ERYTHROCYTE [DISTWIDTH] IN BLOOD BY AUTOMATED COUNT: 14 % (ref 11.6–14.5)
GLOBULIN SER CALC-MCNC: 4.2 G/DL (ref 2–4)
GLUCOSE SERPL-MCNC: 193 MG/DL (ref 74–99)
HCT VFR BLD AUTO: 33.8 % (ref 35–45)
HGB BLD-MCNC: 10.9 G/DL (ref 12–16)
IMM GRANULOCYTES # BLD AUTO: 0 K/UL (ref 0–0.04)
IMM GRANULOCYTES NFR BLD AUTO: 0 % (ref 0–0.5)
LYMPHOCYTES # BLD: 2.4 K/UL (ref 0.9–3.6)
LYMPHOCYTES NFR BLD: 37 % (ref 21–52)
MCH RBC QN AUTO: 28.6 PG (ref 24–34)
MCHC RBC AUTO-ENTMCNC: 32.2 G/DL (ref 31–37)
MCV RBC AUTO: 88.7 FL (ref 78–100)
MONOCYTES # BLD: 0.5 K/UL (ref 0.05–1.2)
MONOCYTES NFR BLD: 7 % (ref 3–10)
NEUTS SEG # BLD: 3 K/UL (ref 1.8–8)
NEUTS SEG NFR BLD: 46 % (ref 40–73)
NRBC # BLD: 0 K/UL (ref 0–0.01)
NRBC BLD-RTO: 0 PER 100 WBC
PLATELET # BLD AUTO: 234 K/UL (ref 135–420)
PMV BLD AUTO: 10.8 FL (ref 9.2–11.8)
POTASSIUM SERPL-SCNC: 4.5 MMOL/L (ref 3.5–5.5)
PROT SERPL-MCNC: 7.4 G/DL (ref 6.4–8.2)
RBC # BLD AUTO: 3.81 M/UL (ref 4.2–5.3)
SODIUM SERPL-SCNC: 140 MMOL/L (ref 136–145)
WBC # BLD AUTO: 6.4 K/UL (ref 4.6–13.2)

## 2021-11-15 PROCEDURE — G8536 NO DOC ELDER MAL SCRN: HCPCS | Performed by: NURSE PRACTITIONER

## 2021-11-15 PROCEDURE — G8754 DIAS BP LESS 90: HCPCS | Performed by: NURSE PRACTITIONER

## 2021-11-15 PROCEDURE — 36415 COLL VENOUS BLD VENIPUNCTURE: CPT

## 2021-11-15 PROCEDURE — G8432 DEP SCR NOT DOC, RNG: HCPCS | Performed by: NURSE PRACTITIONER

## 2021-11-15 PROCEDURE — 99213 OFFICE O/P EST LOW 20 MIN: CPT | Performed by: NURSE PRACTITIONER

## 2021-11-15 PROCEDURE — 80048 BASIC METABOLIC PNL TOTAL CA: CPT

## 2021-11-15 PROCEDURE — 3017F COLORECTAL CA SCREEN DOC REV: CPT | Performed by: NURSE PRACTITIONER

## 2021-11-15 PROCEDURE — G8753 SYS BP > OR = 140: HCPCS | Performed by: NURSE PRACTITIONER

## 2021-11-15 PROCEDURE — G8417 CALC BMI ABV UP PARAM F/U: HCPCS | Performed by: NURSE PRACTITIONER

## 2021-11-15 PROCEDURE — 85025 COMPLETE CBC W/AUTO DIFF WBC: CPT

## 2021-11-15 PROCEDURE — G0463 HOSPITAL OUTPT CLINIC VISIT: HCPCS | Performed by: NURSE PRACTITIONER

## 2021-11-15 PROCEDURE — 80076 HEPATIC FUNCTION PANEL: CPT

## 2021-11-15 PROCEDURE — 1090F PRES/ABSN URINE INCON ASSESS: CPT | Performed by: NURSE PRACTITIONER

## 2021-11-15 PROCEDURE — G8400 PT W/DXA NO RESULTS DOC: HCPCS | Performed by: NURSE PRACTITIONER

## 2021-11-15 PROCEDURE — G8427 DOCREV CUR MEDS BY ELIG CLIN: HCPCS | Performed by: NURSE PRACTITIONER

## 2021-11-15 PROCEDURE — 1101F PT FALLS ASSESS-DOCD LE1/YR: CPT | Performed by: NURSE PRACTITIONER

## 2021-11-15 RX ORDER — OLMESARTAN MEDOXOMIL 40 MG/1
TABLET ORAL DAILY
COMMUNITY

## 2021-11-15 RX ORDER — LETROZOLE 2.5 MG/1
2.5 TABLET, FILM COATED ORAL DAILY
COMMUNITY

## 2021-11-15 RX ORDER — HYDROCHLOROTHIAZIDE 25 MG/1
25 TABLET ORAL DAILY
COMMUNITY

## 2021-11-15 RX ORDER — BISMUTH SUBSALICYLATE 262 MG
1 TABLET,CHEWABLE ORAL DAILY
COMMUNITY

## 2021-11-15 NOTE — PROGRESS NOTES
Brooke Balloon [address]      [facility] MD Nima, Haim Phillips, Rylee Ashford MD, MPH      Kate Lyons, PA-RYLEE Gr, [facility]-BC     Tawny Franco, [facility]   Marianela Creston, FNP-C    Paige Lisa, [facility]       Amira Deputado [address]    at [address], [address] [place] [address], [facility] 22.    [phone]    FAX: [address] [place] [address], [address]    [phone]    FAX: [phone]       Patient Care Team:  Koko Zazueta MD as PCP - General (Internal Medicine)  Jared Jones DPM (Podiatry)  Luis Arellano MD (Nephrology)  Suraj Anderson NP (Nurse Practitioner)  Maddie Sewell MD (Ophthalmology)  Wayne Toussaint MD (Gastroenterology)      Problem List  Date Reviewed: 11/16/2021          Codes Class Noted    CRI (chronic renal insufficiency) ICD-10-CM: N18.9  ICD-9-CM: 585.9  11/16/2021        Hypercholesterolemia ICD-10-CM: E78.00  ICD-9-CM: 272.0  11/16/2021        Breast cancer ([facility] 75.) ICD-10-CM: C50.919  ICD-9-CM: 174.9  11/15/2021        Fatty liver ICD-10-CM: K76.0  ICD-9-CM: 571.8  3/3/2021        Essential hypertension ICD-10-CM: I10  ICD-9-CM: 401.9  8/19/2019        DM type 2 (diabetes mellitus, type 2) ([facility] ICD-10-CM: E11.9  ICD-9-CM: 250.00  8/19/2019              [facility] suspected fatty liver. The active problem list, all pertinent past medical history, medications, radiologic findings and laboratory findings related to the liver disorder were reviewed with the patient. The patient is a 73 y.o. female who was found to have elevated liver enzymes in 2017. Since last visit: The patient was diagnosed with breast cancer.  She is currently on letrozole daily. Serologic evaluation for markers of chronic liver disease were positive for JASS. The most recent imaging of the liver was Ultrasound performed in 9/2019. Results suggest fatty liver disease. Assessment of liver fibrosis with Fibroscan was performed in 4/2021. The result was 2.9 kPa which correlates with no fibrosis. The CAP score of 286 suggests hepatic steatosis. The patient has no symptoms which can be attributed to the liver disorder. The patient has not experienced the following symptoms: fatigue, pain in the right side over the liver, yellowing of the eyes or skin    The patient completes all daily activities without any functional limitations. ASSESSMENT AND PLAN:  Fatty liver    Suspect the patient has fatty liver based upon imaging, Fiboscan CAP score, features of metabolic syndrome,   serologic studies that are negative for other causes of chronic liver disease. The histologic severity has not been defined. Elastography performed in 4/2021 suggests no fibrosis    NAFL is a benign form of fatty liver disease and not thought to progress to fibrosis or cirrhosis. Liver transaminases are normal. ALP is normal. Liver function is normal. The platelet count is normal.      Based upon laboratory studies, Fibroscan, and imaging  the patient does not appear to have significant liver injury. Have performed laboratory testing to monitor liver function and degree of liver injury. This included BMP,   hepatic panel, CBC with platelet count. Serologic testing for causes of chronic liver disease was positive for JASS     Only a liver biopsy can confirm if the patient does have fatty liver and differentiate NAFL from LAGOS. The treatment is the same; weight reduction. If the liver biopsy demonstrates LAGOS the patient could be eligible for enrollment into clinical trials for treatment of LAGOS.     There is no reason to perform liver biopsy at this time.  Liver chemistries will be monitored. If the patient loses 20% of current body weight, which is 38 pounds, down to a weight of 159 pounds, all steatosis will have resolved. There is currently no FDA approved medical treatment for fatty liver, NALFD or LAGOS. The only medical treatments for LAGOS are through clinical trials. The patient will be monitored at periodic intervals. If weight loss is successful hepatic steatosis will resolve and liver enzymes should return to the normal range. The Fibroscan can be repeated annually or as often as clinically indicated to assess for progression or regression of fibrosis. Since a liver biopsy was not performed it is possible the patient may not have fatty liver or this may not be contributing to the elevation in liver enzymes. Counseling for diet and weight loss in patients with confirmed or suspected NAFLD  The patient was counseled regarding diet and exercise to achieve weight loss. The best diet for patients with fatty liver is one very low in carbohydrates and enriched with protein such as an Edith[employer] program.      The patient was told not to consume any food products and drinks containing fructose as this enhances hepatic fat synthesis. There is no medication or vitamin supplements that we advocate for LAGOS. Using glitazones in patients without diabetes mellitus has been shown to reduce fat content in the liver but has no effect on fibrosis and is associated with weight gain. Vitamin E has also been used but the data is not very good and most experts no longer advocate this. Positive serology for autoimmune liver disease  The positive JASS suggests that there may be an underlying autoimmune liver disease. Given that the liver enzymes have returned to normal it is unlikely there is an autoimmune liver disorder.   Will continue to monitor to see if the liver enzymes remain normal, fluctuate or become persistently elevated. Screening for Hepatocellular Carcinoma  HCC screening is not necessary if the patient has no evidence of cirrhosis. Treatment of other medical problems in patients with chronic liver disease  There are no contraindications for the patient to take most medications that are necessary for treatment of other medical issues. Counseling for alcohol in patients with chronic liver disease  The patient was counseled regarding alcohol consumption and the effect of alcohol on chronic liver disease. The patient does not consume any significant amount of alcohol. Vaccinations   Vaccination for viral hepatitis B is recommended since the patient has no serologic evidence of previous exposure or vaccination with immunity. Vaccination for viral hepatitis A is not needed. The patient has serologic evidence of prior exposure or vaccination with immunity. Routine vaccinations against other bacterial and viral agents can be performed as indicated. Annual flu vaccination should be administered if indicated. ALLERGIES  No Known Allergies    MEDICATIONS  Current Outpatient Medications   Medication Sig    olmesartan (BENICAR) 40 mg tablet Take  by mouth daily.  hydroCHLOROthiazide (HYDRODIURIL) 25 mg tablet Take 25 mg by mouth daily.  multivitamin (ONE A DAY) tablet Take 1 Tablet by mouth daily.  letrozole (FEMARA) 2.5 mg tablet Take 2.5 mg by mouth daily.  amLODIPine (NORVASC) 5 mg tablet Take 5 mg by mouth daily.  insulin NPH/insulin regular (NOVOLIN 70/30 U-100 INSULIN) 100 unit/mL (70-30) injection by SubCUTAneous route.  iron fum,[id number]-Ca-succ (MULTIGEN PLUS) tablet Take 1 Tab by mouth daily.  brimonidine-timolol (COMBIGAN) 0.2-0.5 % drop ophthalmic solution 1 Drop every twelve (12) hours.  CALCIUM OYSTER SHELL PO Take  by mouth.  cholecalciferol (VITAMIN D3) 1,000 unit tablet Take  by mouth daily.  allopurinol (ZYLOPRIM) 300 mg tablet Take  by mouth daily.     lovastatin (MEVACOR) 20 mg tablet Take 20 mg by mouth nightly.  aspirin delayed-release 81 mg tablet Take  by mouth daily.  co-enzyme Q-10 (COQ-10) 100 mg capsule Take 50 mg by mouth daily. No current facility-administered medications for this visit. SYSTEM REVIEW NOT RELATED TO LIVER DISEASE OR REVIEWED ABOVE:  Constitution systems: Negative for fever, chills, weight gain, weight loss. Eyes: Negative for visual changes. ENT: Negative for sore throat, painful swallowing. Respiratory: Negative for cough, hemoptysis, SOB. Cardiology: Negative for chest pain, palpitations. GI:  Negative for constipation or diarrhea. : Negative for urinary frequency, dysuria, hematuria, nocturia. Skin: Negative for rash. Hematology: Negative for easy bruising, blood clots. Musculo-skelatal: Negative for back pain, muscle pain, weakness. Neurologic: Negative for headaches, dizziness, vertigo, memory problems not related to HE. Psychology: Negative for anxiety, depression. FAMILY HISTORY:  The father is . The mother is . There is no family history of liver disease. There is no family history of immune disorders. SOCIAL HISTORY:  The patient is . The patient has 3 children, 4 grandchildren. 3 great grand children. The patient has never used tobacco products. The patient does not drink alcohol   The patient is retired. PHYSICAL EXAMINATION:  Visit Vitals  BP (!) 162/73   Pulse 69   Temp 98.1 °F (36.7 °C)   Resp 17   Ht 5' 5\" (1.651 m)   Wt 188 lb (85.3 kg)   SpO2 98%   BMI 31.28 kg/m²       General: No acute distress. Eyes: Sclera anicteric. ENT: No oral lesions. Thyroid normal.  Nodes: No adenopathy. Skin: No spider angiomata. No jaundice. No palmar erythema. Respiratory: Lungs clear to auscultation. Cardiovascular: Regular heart rate. No murmurs. No JVD. Abdomen: Soft non-tender. Liver size normal to percussion/palpation.   Spleen not palpable. No obvious ascites. Extremities: No edema. No muscle wasting. No gross arthritic changes. Neurologic: Alert and oriented. Cranial nerves grossly intact. No asterixis. LABORATORY STUDIES:  Liver [place] [address] & Units 11/15/2021 3/2/2021   WBC 4.6 - 13.2 K/uL 6.4    ANC 1.8 - 8.0 K/UL 3.0    HGB 12.0 - 16.0 g/dL 10.9 (L)     - 420 K/uL 234    AST 10 - 38 U/L 39 (H) 37   ALT 13 - 56 U/L 58 (H) 56   Alk Phos 45 - 117 U/L 73 80   Bili, Total 0.2 - 1.0 MG/DL 0.4 0.4   Bili, Direct 0.0 - 0.2 MG/DL 0.1 0.1   Albumin 3.4 - 5.0 g/dL 3.2 (L) 3.2 (L)   BUN 7.0 - 18 MG/DL 53 (H)    Creat 0.6 - 1.3 MG/DL 1.70 (H)    Na 136 - 145 mmol/L 140    K 3.5 - 5.5 mmol/L 4.5    Cl 100 - 111 mmol/L 107    CO2 21 - 32 mmol/L 29    Glucose 74 - 99 mg/dL 193 (H)        SEROLOGIES:  Serologies Latest Ref Rng & Units 8/19/2019   Hep A Ab, Total NEGATIVE   Positive (A)   Hep B Surface Ag <1.00 Index 0.21   Hep B Surface Ag Interp NEG   NEGATIVE   Hep B Core Ab, Total NEGATIVE   NEGATIVE   Hep B Surface Ab >10.0 mIU/mL <3.10 (L)   Hep B Surface Ab Interp POS   NEGATIVE (A)   Ferritin 8 - 388 NG/ (H)   Iron % Saturation % 26   JASS, IFA  Positive (A)   JASS Pattern  1:320 (H)   JASS Pattern  1:640 (H)   C-ANCA Neg:<1:20 titer <1:20   P-ANCA Neg:<1:20 titer <1:20   ANCA Neg:<1:20 titer <1:20   ASMCA 0 - 19 Units 6   M2 Ab 0.0 - 20.0 Units <20.0   Anti-SLA 0.0 - 20.0 units 0.8   Alpha-1 antitrypsin level 90 - 200 mg/dL 112     LIVER HISTOLOGY:  9/2019. Shear wave elastography performed at [facility]. E Range: 4.18-8.51 kPa, E Mean: 6.25 kPa, E Median: 5.93 kPa, E Std: 1.47 kPa. The results suggested a fibrosis level of F1.  2/2020. FibroScan performed at [address]. EkPa was 6.4. IQR/med 17%. . The results suggested a fibrosis level of F1. The CAP score suggests fatty liver  4/2021. FibroScan performed at [address]. EkPa was 2.9. IQR/med 21%. .  The results suggested a fibrosis level of F0. The CAP score suggests there is hepatic steatosis. ENDOSCOPIC PROCEDURES:  Not available or performed    RADIOLOGY:  8/2017. Ultrasound of liver. Echogenic consistent with fatty liver. No liver mass lesions. No dilated bile ducts. No ascites. 9/2019. Ultrasound of liver. Echogenic consistent with fatty liver. No liver mass lesions. No dilated bile ducts. No ascites. OTHER TESTING:  Not available or performed    FOLLOW-UP:  All of the issues listed above in the Assessment and Plan were discussed with the patient. All questions were answered. The patient expressed a clear understanding of the above. [address] [address] in 6 months for monitoring. Fibroscan at next visit.        Fidelia Edwards AGPCNP-BC  [address] Liver [place] [address]  [place], [address] [place] [address] Street - [address]  [phone]

## 2021-11-16 PROBLEM — N18.9 CRI (CHRONIC RENAL INSUFFICIENCY): Status: ACTIVE | Noted: 2021-11-16

## 2021-11-16 PROBLEM — E78.00 HYPERCHOLESTEROLEMIA: Status: ACTIVE | Noted: 2021-11-16

## 2021-11-18 NOTE — PROGRESS NOTES
Liver enzymes elevated. Liver function is stable. Creatinine is elevated.  Please route lab results to PCP

## 2021-11-22 ENCOUNTER — TELEPHONE (OUTPATIENT)
Dept: HEMATOLOGY | Age: 71
End: 2021-11-22

## 2021-11-22 NOTE — TELEPHONE ENCOUNTER
----- Message from Paige Gonzalez NP sent at 11/18/2021  4:14 PM EST -----  Liver enzymes elevated. Liver function is stable. Creatinine is elevated.  Please route lab results to PCP

## 2022-03-18 PROBLEM — C50.919 BREAST CANCER (HCC): Status: ACTIVE | Noted: 2021-11-15

## 2022-03-19 PROBLEM — E11.9 DM TYPE 2 (DIABETES MELLITUS, TYPE 2) (HCC): Status: ACTIVE | Noted: 2019-08-19

## 2022-03-19 PROBLEM — N18.9 CRI (CHRONIC RENAL INSUFFICIENCY): Status: ACTIVE | Noted: 2021-11-16

## 2022-03-19 PROBLEM — I10 ESSENTIAL HYPERTENSION: Status: ACTIVE | Noted: 2019-08-19

## 2022-03-19 PROBLEM — K76.0 FATTY LIVER: Status: ACTIVE | Noted: 2021-03-03

## 2022-03-19 PROBLEM — E78.00 HYPERCHOLESTEROLEMIA: Status: ACTIVE | Noted: 2021-11-16

## 2022-06-06 ENCOUNTER — OFFICE VISIT (OUTPATIENT)
Dept: HEMATOLOGY | Age: 72
End: 2022-06-06
Payer: MEDICARE

## 2022-06-06 VITALS
DIASTOLIC BLOOD PRESSURE: 74 MMHG | WEIGHT: 193 LBS | HEIGHT: 65 IN | OXYGEN SATURATION: 98 % | RESPIRATION RATE: 20 BRPM | BODY MASS INDEX: 32.15 KG/M2 | HEART RATE: 66 BPM | SYSTOLIC BLOOD PRESSURE: 159 MMHG | TEMPERATURE: 97.7 F

## 2022-06-06 DIAGNOSIS — K76.0 FATTY LIVER: Primary | ICD-10-CM

## 2022-06-06 PROCEDURE — 1123F ACP DISCUSS/DSCN MKR DOCD: CPT | Performed by: NURSE PRACTITIONER

## 2022-06-06 PROCEDURE — G8400 PT W/DXA NO RESULTS DOC: HCPCS | Performed by: NURSE PRACTITIONER

## 2022-06-06 PROCEDURE — G8427 DOCREV CUR MEDS BY ELIG CLIN: HCPCS | Performed by: NURSE PRACTITIONER

## 2022-06-06 PROCEDURE — 99213 OFFICE O/P EST LOW 20 MIN: CPT | Performed by: NURSE PRACTITIONER

## 2022-06-06 PROCEDURE — G8753 SYS BP > OR = 140: HCPCS | Performed by: NURSE PRACTITIONER

## 2022-06-06 PROCEDURE — 1090F PRES/ABSN URINE INCON ASSESS: CPT | Performed by: NURSE PRACTITIONER

## 2022-06-06 PROCEDURE — G0463 HOSPITAL OUTPT CLINIC VISIT: HCPCS | Performed by: NURSE PRACTITIONER

## 2022-06-06 PROCEDURE — G8536 NO DOC ELDER MAL SCRN: HCPCS | Performed by: NURSE PRACTITIONER

## 2022-06-06 PROCEDURE — G8754 DIAS BP LESS 90: HCPCS | Performed by: NURSE PRACTITIONER

## 2022-06-06 PROCEDURE — 1101F PT FALLS ASSESS-DOCD LE1/YR: CPT | Performed by: NURSE PRACTITIONER

## 2022-06-06 PROCEDURE — G8417 CALC BMI ABV UP PARAM F/U: HCPCS | Performed by: NURSE PRACTITIONER

## 2022-06-06 PROCEDURE — 3017F COLORECTAL CA SCREEN DOC REV: CPT | Performed by: NURSE PRACTITIONER

## 2022-06-06 PROCEDURE — G8432 DEP SCR NOT DOC, RNG: HCPCS | Performed by: NURSE PRACTITIONER

## 2022-06-06 PROCEDURE — 91200 LIVER ELASTOGRAPHY: CPT | Performed by: NURSE PRACTITIONER

## 2022-06-06 NOTE — PROGRESS NOTES
[address] [place] [address], MD, Catalino Motley, Patrick Karol [place], [place]      Judeen Rands, PA-C Hermine Spatz, [facility]-BC     Tawny Franco, [facility]   DENISE Capone-RYLEE Chavez, [facility]       Amira Frye [facility] 136    at [address], [address] [place] [address], [facility] 22.    [phone]    FAX: [address] [place] [address]    at [address], [address]    [phone]    FAX: [phone]     Patient Care Team:  Christy Wooten MD as PCP - General (Internal Medicine Physician)  Rd Farias DPM (Podiatry)  Monico Andrew MD (Nephrology)  Emanuel Stephens NP (Nurse Practitioner)  Noris Wooten MD (Ophthalmology)  Marcelle Goodell, MD (Gastroenterology)      Problem List  Date Reviewed: 11/16/2021          Codes Class Noted    CRI (chronic renal insufficiency) ICD-10-CM: N18.9  ICD-9-CM: 585.9  11/16/2021        Hypercholesterolemia ICD-10-CM: E78.00  ICD-9-CM: 272.0  11/16/2021        Breast cancer ([facility] 75.) ICD-10-CM: C50.919  ICD-9-CM: 174.9  11/15/2021        Fatty liver ICD-10-CM: K76.0  ICD-9-CM: 571.8  3/3/2021        Essential hypertension ICD-10-CM: I10  ICD-9-CM: 401.9  8/19/2019        DM type 2 (diabetes mellitus, type 2) ([facility].) ICD-10-CM: E11.9  ICD-9-CM: 250.00  8/19/2019              Harpal Castillo returns to the [address] for management of suspected fatty liver. The active problem list, all pertinent past medical history, medications, radiologic findings and laboratory findings related to the liver disorder were reviewed with the patient. The patient is a 81 y.o. female who was found to have elevated liver enzymes in 2017. Since last visit: The patient was diagnosed with breast cancer.  She is currently on letrozole daily.    Serologic evaluation for markers of chronic liver disease were positive for JASS. The most recent imaging of the liver was Ultrasound performed in 9/2019. Results suggest fatty liver disease. Assessment of liver fibrosis with Fibroscan was performed in the office today. The result was 3.4 kPa which correlates with no fibrosis. The CAP score of 272 suggests hepatic steatosis. The patient has no symptoms which can be attributed to the liver disorder. The patient has not experienced the following symptoms: fatigue, pain in the right side over the liver, yellowing of the eyes or skin    The patient completes all daily activities without any functional limitations. ASSESSMENT AND PLAN:  Fatty liver    Suspect the patient has fatty liver based upon imaging, Fiboscan CAP score, features of metabolic syndrome,   serologic studies that are negative for other causes of chronic liver disease. The histologic severity has not been defined. Elastography performed in 4/2021 suggests no fibrosis    NAFL is a benign form of fatty liver disease and not thought to progress to fibrosis or cirrhosis. Liver transaminases are elevated. ALP is normal. Liver function is normal. The platelet count is normal.      Based upon laboratory studies, Fibroscan, and imaging  the patient does not appear to have significant liver injury. Will perform laboratory testing to monitor liver function and degree of liver injury. This included BMP,   hepatic panel, CBC with platelet count. Serologic testing for causes of chronic liver disease was positive for JASS     Only a liver biopsy can confirm if the patient does have fatty liver and differentiate NAFL from LAGOS. The treatment is the same; weight reduction. If the liver biopsy demonstrates LAGOS the patient could be eligible for enrollment into clinical trials for treatment of LAGOS. There is no reason to perform liver biopsy at this time.   Liver chemistries will be monitored. If the patient loses 20% of current body weight, which is 38 pounds, down to a weight of 159 pounds, all steatosis will have resolved. There is currently no FDA approved medical treatment for fatty liver, NALFD or LAGOS. The only medical treatments for LAGOS are through clinical trials. The patient will be monitored at periodic intervals. If weight loss is successful hepatic steatosis will resolve and liver enzymes should return to the normal range. The Fibroscan can be repeated annually or as often as clinically indicated to assess for progression or regression of fibrosis. Since a liver biopsy was not performed it is possible the patient may not have fatty liver or this may not be contributing to the elevation in liver enzymes. Counseling for diet and weight loss in patients with confirmed or suspected NAFLD  The patient was counseled regarding diet and exercise to achieve weight loss. The best diet for patients with fatty liver is one very low in carbohydrates and enriched with protein such as an Edith's program.      The patient was told not to consume any food products and drinks containing fructose as this enhances hepatic fat synthesis. There is no medication or vitamin supplements that we advocate for LAGOS. Using glitazones in patients without diabetes mellitus has been shown to reduce fat content in the liver but has no effect on fibrosis and is associated with weight gain. Vitamin E has also been used but the data is not very good and most experts no longer advocate this. Positive serology for autoimmune liver disease  The positive JASS suggests that there may be an underlying autoimmune liver disease. Given that the liver enzymes have returned to normal it is unlikely there is an autoimmune liver disorder. Will continue to monitor to see if the liver enzymes remain normal, fluctuate or become persistently elevated.     Screening for Hepatocellular Carcinoma  HCC screening is not necessary if the patient has no evidence of cirrhosis. Treatment of other medical problems in patients with chronic liver disease  There are no contraindications for the patient to take most medications that are necessary for treatment of other medical issues. Counseling for alcohol in patients with chronic liver disease  The patient was counseled regarding alcohol consumption and the effect of alcohol on chronic liver disease. The patient does not consume any significant amount of alcohol. Vaccinations   Vaccination for viral hepatitis B is recommended since the patient has no serologic evidence of previous exposure or vaccination with immunity. Vaccination for viral hepatitis A is not needed. The patient has serologic evidence of prior exposure or vaccination with immunity. Routine vaccinations against other bacterial and viral agents can be performed as indicated. Annual flu vaccination should be administered if indicated. ALLERGIES  No Known Allergies    MEDICATIONS  Current Outpatient Medications   Medication Sig    olmesartan (BENICAR) 40 mg tablet Take  by mouth daily.  hydroCHLOROthiazide (HYDRODIURIL) 25 mg tablet Take 25 mg by mouth daily.  multivitamin (ONE A DAY) tablet Take 1 Tablet by mouth daily.  letrozole (FEMARA) 2.5 mg tablet Take 2.5 mg by mouth daily.  amLODIPine (NORVASC) 5 mg tablet Take 5 mg by mouth daily.  insulin NPH/insulin regular (NOVOLIN 70/30 U-100 INSULIN) 100 unit/mL (70-30) injection by SubCUTAneous route.  iron fum,[id number]-[id number]-Ca-succ (MULTIGEN PLUS) tablet Take 1 Tab by mouth daily.  brimonidine-timolol (COMBIGAN) 0.2-0.5 % drop ophthalmic solution 1 Drop every twelve (12) hours.  CALCIUM OYSTER SHELL PO Take  by mouth.  cholecalciferol (VITAMIN D3) 1,000 unit tablet Take  by mouth daily.  allopurinol (ZYLOPRIM) 300 mg tablet Take  by mouth daily.     lovastatin (MEVACOR) 20 mg tablet Take 20 mg by mouth nightly.  aspirin delayed-release 81 mg tablet Take  by mouth daily.  co-enzyme Q-10 (COQ-10) 100 mg capsule Take 50 mg by mouth daily. No current facility-administered medications for this visit. SYSTEM REVIEW NOT RELATED TO LIVER DISEASE OR REVIEWED ABOVE:  Constitution systems: Negative for fever, chills, weight gain, weight loss. Eyes: Negative for visual changes. ENT: Negative for sore throat, painful swallowing. Respiratory: Negative for cough, hemoptysis, SOB. Cardiology: Negative for chest pain, palpitations. GI:  Negative for constipation or diarrhea. : Negative for urinary frequency, dysuria, hematuria, nocturia. Skin: Negative for rash. Hematology: Negative for easy bruising, blood clots. Musculo-skelatal: Negative for back pain, muscle pain, weakness. Neurologic: Negative for headaches, dizziness, vertigo, memory problems not related to HE. Psychology: Negative for anxiety, depression. FAMILY HISTORY:  The father is . The mother is . There is no family history of liver disease. There is no family history of immune disorders. SOCIAL HISTORY:  The patient is . The patient has 3 children, 4 grandchildren. 3 great grand children. The patient has never used tobacco products. The patient does not drink alcohol   The patient is retired. PHYSICAL EXAMINATION:  Visit Vitals  BP (!) 159/74 (BP 1 Location: Left arm, BP Patient Position: Sitting, BP Cuff Size: Adult long)   Pulse 66   Temp 97.7 °F (36.5 °C) (Tympanic)   Resp 20   Ht 5' 5\" (1.651 m)   Wt 193 lb (87.5 kg)   SpO2 98%   BMI 32.12 kg/m²       General: No acute distress. Eyes: Sclera anicteric. ENT: No oral lesions. Thyroid normal.  Nodes: No adenopathy. Skin: No spider angiomata. No jaundice. No palmar erythema. Respiratory: Lungs clear to auscultation. Cardiovascular: Regular heart rate. No murmurs. No JVD.   Abdomen: Soft non-tender. Liver size normal to percussion/palpation. Spleen not palpable. No obvious ascites. Extremities: No edema. No muscle wasting. No gross arthritic changes. Neurologic: Alert and oriented. Cranial nerves grossly intact. No asterixis. LABORATORY STUDIES:  Liver [place] [id number] & Units 11/15/2021 3/2/2021   WBC 4.6 - 13.2 K/uL 6.4    ANC 1.8 - 8.0 K/UL 3.0    HGB 12.0 - 16.0 g/dL 10.9 (L)     - 420 K/uL 234    AST 10 - 38 U/L 39 (H) 37   ALT 13 - 56 U/L 58 (H) 56   Alk Phos 45 - 117 U/L 73 80   Bili, Total 0.2 - 1.0 MG/DL 0.4 0.4   Bili, Direct 0.0 - 0.2 MG/DL 0.1 0.1   Albumin 3.4 - 5.0 g/dL 3.2 (L) 3.2 (L)   BUN 7.0 - 18 MG/DL 53 (H)    Creat 0.6 - 1.3 MG/DL 1.70 (H)    Na 136 - 145 mmol/L 140    K 3.5 - 5.5 mmol/L 4.5    Cl 100 - 111 mmol/L 107    CO2 21 - 32 mmol/L 29    Glucose 74 - 99 mg/dL 193 (H)        SEROLOGIES:  Serologies Latest Ref Rng & Units 8/19/2019   Hep A Ab, Total NEGATIVE   Positive (A)   Hep B Surface Ag <1.00 Index 0.21   Hep B Surface Ag Interp NEG   NEGATIVE   Hep B Core Ab, Total NEGATIVE   NEGATIVE   Hep B Surface Ab >10.0 mIU/mL <3.10 (L)   Hep B Surface Ab Interp POS   NEGATIVE (A)   Ferritin 8 - 388 NG/ (H)   Iron % Saturation % 26   JASS, IFA  Positive (A)   JASS Pattern  1:320 (H)   JASS Pattern  1:640 (H)   C-ANCA Neg:<1:20 titer <1:20   P-ANCA Neg:<1:20 titer <1:20   ANCA Neg:<1:20 titer <1:20   ASMCA 0 - 19 Units 6   M2 Ab 0.0 - 20.0 Units <20.0   Anti-SLA 0.0 - 20.0 units 0.8   Alpha-1 antitrypsin level 90 - 200 mg/dL 112     LIVER HISTOLOGY:  2/2020. FibroScan performed at [address]. EkPa was 6.4. IQR/med 17%. . The results suggested a fibrosis level of F1. The CAP score suggests fatty liver    4/2021. FibroScan performed at [address]. EkPa was 2.9. IQR/med 21%. . The results suggested a fibrosis level of F0. The CAP score suggests there is hepatic steatosis. 6/2022.  FibroScan performed at [address]. EkPa was 3.4. IQR/med 16%. . The results suggested a fibrosis level of F0. The CAP score suggests there is hepatic steatosis. ENDOSCOPIC PROCEDURES:  Not available or performed    RADIOLOGY:  8/2017. Ultrasound of liver. Echogenic consistent with fatty liver. No liver mass lesions. No dilated bile ducts. No ascites. 9/2019. Ultrasound of liver. Echogenic consistent with fatty liver. No liver mass lesions. No dilated bile ducts. No ascites. OTHER TESTING:  Not available or performed    FOLLOW-UP:  All of the issues listed above in the Assessment and Plan were discussed with the patient. All questions were answered. The patient expressed a clear understanding of the above. [facility] 87 in 6 months for monitoring.        Cyndi Newell, CARLOS APCNP-BC  [address] Liver [place] [facility] [place] [address] [address]  [address]  [phone]

## 2023-02-07 ENCOUNTER — OFFICE VISIT (OUTPATIENT)
Dept: HEMATOLOGY | Age: 73
End: 2023-02-07
Payer: MEDICARE

## 2023-02-07 VITALS
TEMPERATURE: 97.6 F | DIASTOLIC BLOOD PRESSURE: 74 MMHG | OXYGEN SATURATION: 97 % | HEART RATE: 69 BPM | SYSTOLIC BLOOD PRESSURE: 176 MMHG | WEIGHT: 186 LBS | BODY MASS INDEX: 30.95 KG/M2

## 2023-02-07 DIAGNOSIS — K76.0 FATTY LIVER: Primary | ICD-10-CM

## 2023-02-07 PROCEDURE — 1090F PRES/ABSN URINE INCON ASSESS: CPT | Performed by: NURSE PRACTITIONER

## 2023-02-07 PROCEDURE — G8417 CALC BMI ABV UP PARAM F/U: HCPCS | Performed by: NURSE PRACTITIONER

## 2023-02-07 PROCEDURE — G8432 DEP SCR NOT DOC, RNG: HCPCS | Performed by: NURSE PRACTITIONER

## 2023-02-07 PROCEDURE — 1123F ACP DISCUSS/DSCN MKR DOCD: CPT | Performed by: NURSE PRACTITIONER

## 2023-02-07 PROCEDURE — 3017F COLORECTAL CA SCREEN DOC REV: CPT | Performed by: NURSE PRACTITIONER

## 2023-02-07 PROCEDURE — 1101F PT FALLS ASSESS-DOCD LE1/YR: CPT | Performed by: NURSE PRACTITIONER

## 2023-02-07 PROCEDURE — 3078F DIAST BP <80 MM HG: CPT | Performed by: NURSE PRACTITIONER

## 2023-02-07 PROCEDURE — 3074F SYST BP LT 130 MM HG: CPT | Performed by: NURSE PRACTITIONER

## 2023-02-07 PROCEDURE — G8536 NO DOC ELDER MAL SCRN: HCPCS | Performed by: NURSE PRACTITIONER

## 2023-02-07 PROCEDURE — G8427 DOCREV CUR MEDS BY ELIG CLIN: HCPCS | Performed by: NURSE PRACTITIONER

## 2023-02-07 PROCEDURE — 99213 OFFICE O/P EST LOW 20 MIN: CPT | Performed by: NURSE PRACTITIONER

## 2023-02-07 PROCEDURE — G8400 PT W/DXA NO RESULTS DOC: HCPCS | Performed by: NURSE PRACTITIONER

## 2023-02-07 NOTE — PROGRESS NOTES
[address] [facility], [place], [address], [facility], [place]      LILLIAN Cox-RYLEE Hector S Salvador, AGPCNP-BC   Alex Blair, ACNPC-AG   Omaira Dunaway, FNP-C  Galen Gold, FNP-C   Moiz Snider, AGPCNP-BC      [address]   at [facility]   [address] [place] [address] Ave, [address]  22.   [phone]   FAX: [address]   at [address], [address] [place] [address], [address]   [phone]   FAX: [phone]     Patient Care Team:  Eileen Mcginnis MD as PCP - General (Internal Medicine Physician)  Cristian Lovell DPM (Podiatry)  Manuelito Abdi MD (Nephrology)  Js Nassar NP (Nurse Practitioner)  Manfred Medrano MD (Ophthalmology)  Inderjit Jimenez MD (Gastroenterology)      Problem List  Date Reviewed: 6/6/2022            Codes Class Noted    CRI (chronic renal insufficiency) ICD-10-CM: N18.9  ICD-9-CM: 585.9  11/16/2021        Hypercholesterolemia ICD-10-CM: E78.00  ICD-9-CM: 272.0  11/16/2021        Breast cancer ([facility] 75.) ICD-10-CM: C50.919  ICD-9-CM: 174.9  11/15/2021        Fatty liver ICD-10-CM: K76.0  ICD-9-CM: 571.8  3/3/2021        Essential hypertension ICD-10-CM: I10  ICD-9-CM: 401.9  8/19/2019        DM type 2 (diabetes mellitus, type 2) ([facility] 75.) ICD-10-CM: E11.9  ICD-9-CM: 250.00  8/19/2019           Jennifer Tomlin returns to the [address] for management of suspected fatty liver. The active problem list, all pertinent past medical history, medications, radiologic findings and laboratory findings related to the liver disorder were reviewed with the patient. The patient is a 81 y.o. female who was found to have elevated liver enzymes in 2017. Since last visit: The patient was diagnosed with breast cancer. She is currently on letrozole daily.     Serologic evaluation for markers of chronic liver disease were positive for JASS. The most recent imaging of the liver was Ultrasound performed in 9/2019. Results suggest fatty liver disease. Assessment of liver fibrosis with Fibroscan was performed in the office today. The result was 3.4 kPa which correlates with no fibrosis. The CAP score of 272 suggests hepatic steatosis. The patient has no symptoms which can be attributed to the liver disorder. The patient has not experienced the following symptoms: fatigue, pain in the right side over the liver, yellowing of the eyes or skin    The patient completes all daily activities without any functional limitations. ASSESSMENT AND PLAN:  Fatty liver    Suspect the patient has fatty liver based upon imaging, Fiboscan CAP score, features of metabolic syndrome,   serologic studies that are negative for other causes of chronic liver disease. The histologic severity has not been defined. Elastography performed in 4/2021 suggests no fibrosis    NAFL is a benign form of fatty liver disease and not thought to progress to fibrosis or cirrhosis. Liver transaminases are elevated. ALP is normal. Liver function is normal. The platelet count is normal.      Based upon laboratory studies, Fibroscan, and imaging  the patient does not appear to have significant liver injury. Will perform laboratory testing to monitor liver function and degree of liver injury. This included BMP,   hepatic panel, CBC with platelet count. Serologic testing for causes of chronic liver disease was positive for JASS     Only a liver biopsy can confirm if the patient does have fatty liver and differentiate NAFL from LAGOS. The treatment is the same; weight reduction. If the liver biopsy demonstrates LAGOS the patient could be eligible for enrollment into clinical trials for treatment of LAGOS. There is no reason to perform liver biopsy at this time.   Liver chemistries will be monitored. If the patient loses 20% of current body weight, which is 38 pounds, down to a weight of 159 pounds, all steatosis will have resolved. There is currently no FDA approved medical treatment for fatty liver, NALFD or LAGOS. The only medical treatments for LAGOS are through clinical trials. The patient will be monitored at periodic intervals. If weight loss is successful hepatic steatosis will resolve and liver enzymes should return to the normal range. The Fibroscan can be repeated annually or as often as clinically indicated to assess for progression or regression of fibrosis. Since a liver biopsy was not performed it is possible the patient may not have fatty liver or this may not be contributing to the elevation in liver enzymes. Counseling for diet and weight loss in patients with confirmed or suspected NAFLD  The patient was counseled regarding diet and exercise to achieve weight loss. The best diet for patients with fatty liver is one very low in carbohydrates and enriched with protein such as an Edith's program.      The patient was told not to consume any food products and drinks containing fructose as this enhances hepatic fat synthesis. There is no medication or vitamin supplements that we advocate for LAGOS. Using glitazones in patients without diabetes mellitus has been shown to reduce fat content in the liver but has no effect on fibrosis and is associated with weight gain. Vitamin E has also been used but the data is not very good and most experts no longer advocate this. Positive serology for autoimmune liver disease  The positive JASS suggests that there may be an underlying autoimmune liver disease. Given that the liver enzymes have returned to normal it is unlikely there is an autoimmune liver disorder. Will continue to monitor to see if the liver enzymes remain normal, fluctuate or become persistently elevated.     Screening for Hepatocellular Carcinoma  HCC screening is not necessary if the patient has no evidence of cirrhosis. Treatment of other medical problems in patients with chronic liver disease  There are no contraindications for the patient to take most medications that are necessary for treatment of other medical issues. Counseling for alcohol in patients with chronic liver disease  The patient was counseled regarding alcohol consumption and the effect of alcohol on chronic liver disease. The patient does not consume any significant amount of alcohol. Vaccinations   Vaccination for viral hepatitis B is recommended since the patient has no serologic evidence of previous exposure or vaccination with immunity. Vaccination for viral hepatitis A is not needed. The patient has serologic evidence of prior exposure or vaccination with immunity. Routine vaccinations against other bacterial and viral agents can be performed as indicated. Annual flu vaccination should be administered if indicated. ALLERGIES  No Known Allergies    MEDICATIONS  Current Outpatient Medications   Medication Sig    olmesartan (BENICAR) 40 mg tablet Take  by mouth daily. hydroCHLOROthiazide (HYDRODIURIL) 25 mg tablet Take 25 mg by mouth daily. multivitamin (ONE A DAY) tablet Take 1 Tablet by mouth daily. letrozole (FEMARA) 2.5 mg tablet Take 2.5 mg by mouth daily. amLODIPine (NORVASC) 5 mg tablet Take 5 mg by mouth daily. insulin NPH/insulin regular (NOVOLIN 70/30, HUMULIN 70/30) 100 unit/mL (70-30) injection by SubCUTAneous route. iron fum,[id number]-C-[id number]-[id number]-Ca-succ (MULTIGEN PLUS) tablet Take 1 Tab by mouth daily. brimonidine-timoloL (COMBIGAN) 0.2-0.5 % drop ophthalmic solution 1 Drop every twelve (12) hours. CALCIUM OYSTER SHELL PO Take  by mouth. cholecalciferol (VITAMIN D3) (1000 Units /25 mcg) tablet Take  by mouth daily. allopurinol (ZYLOPRIM) 300 mg tablet Take  by mouth daily.     aspirin delayed-release 81 mg tablet Take  by mouth daily. co-enzyme Q-10 (CO Q-10) 100 mg capsule Take 50 mg by mouth daily. No current facility-administered medications for this visit. SYSTEM REVIEW NOT RELATED TO LIVER DISEASE OR REVIEWED ABOVE:  Constitution systems: Negative for fever, chills, weight gain, weight loss. Eyes: Negative for visual changes. ENT: Negative for sore throat, painful swallowing. Respiratory: Negative for cough, hemoptysis, SOB. Cardiology: Negative for chest pain, palpitations. GI:  Negative for constipation or diarrhea. : Negative for urinary frequency, dysuria, hematuria, nocturia. Skin: Negative for rash. Hematology: Negative for easy bruising, blood clots. Musculo-skelatal: Negative for back pain, muscle pain, weakness. Neurologic: Negative for headaches, dizziness, vertigo, memory problems not related to HE. Psychology: Negative for anxiety, depression. FAMILY HISTORY:  The father is . The mother is . There is no family history of liver disease. There is no family history of immune disorders. SOCIAL HISTORY:  The patient is . The patient has 3 children, 4 grandchildren. 3 great grand children. The patient has never used tobacco products. The patient does not drink alcohol   The patient is retired. PHYSICAL EXAMINATION:  Visit Vitals  BP (!) 174/74   Pulse 69   Temp 97.6 °F (36.4 °C) (Tympanic)   Wt 186 lb (84.4 kg)   SpO2 97%   BMI 30.95 kg/m²       General: No acute distress. Eyes: Sclera anicteric. ENT: No oral lesions. Thyroid normal.  Nodes: No adenopathy. Skin: No spider angiomata. No jaundice. No palmar erythema. Respiratory: Lungs clear to auscultation. Cardiovascular: Regular heart rate. No murmurs. No JVD. Abdomen: Soft non-tender. Liver size normal to percussion/palpation. Spleen not palpable. No obvious ascites. Extremities: No edema. No muscle wasting. No gross arthritic changes. Neurologic: Alert and oriented. Cranial nerves grossly intact. No asterixis. LABORATORY STUDIES:  From 1/2023  AST/ALT/ALP/T Bili/ALB: 35/41/82/0.3/3.5  NA/BUN/CREAT: 143/45/1.4    SEROLOGIES:  Serologies Latest Ref Rng & Units 8/19/2019   Hep A Ab, Total NEGATIVE   Positive (A)   Hep B Surface Ag <1.00 Index 0.21   Hep B Surface Ag Interp NEG   NEGATIVE   Hep B Core Ab, Total NEGATIVE   NEGATIVE   Hep B Surface Ab >10.0 mIU/mL <3.10 (L)   Hep B Surface Ab Interp POS   NEGATIVE (A)   Ferritin 8 - 388 NG/ (H)   Iron % Saturation % 26   JASS, IFA  Positive (A)   JASS Pattern  1:320 (H)   JASS Pattern  1:640 (H)   C-ANCA Neg:<1:20 titer <1:20   P-ANCA Neg:<1:20 titer <1:20   ANCA Neg:<1:20 titer <1:20   ASMCA 0 - 19 Units 6   M2 Ab 0.0 - 20.0 Units <20.0   Anti-SLA 0.0 - 20.0 units 0.8   Alpha-1 antitrypsin level 90 - 200 mg/dL 112     LIVER HISTOLOGY:  2/2020. FibroScan performed at [facility]. EkPa was 6.4. IQR/med 17%. . The results suggested a fibrosis level of F1. The CAP score suggests fatty liver    4/2021. FibroScan performed at [facility]. EkPa was 2.9. IQR/med 21%. . The results suggested a fibrosis level of F0. The CAP score suggests there is hepatic steatosis. 6/2022. FibroScan performed at [facility]. EkPa was 3.4. IQR/med 16%. . The results suggested a fibrosis level of F0. The CAP score suggests there is hepatic steatosis. ENDOSCOPIC PROCEDURES:  Not available or performed    RADIOLOGY:  8/2017. Ultrasound of liver. Echogenic consistent with fatty liver. No liver mass lesions. No dilated bile ducts. No ascites. 9/2019. Ultrasound of liver. Echogenic consistent with fatty liver. No liver mass lesions. No dilated bile ducts. No ascites. OTHER TESTING:  Not available or performed    FOLLOW-UP:  All of the issues listed above in the Assessment and Plan were discussed with the patient. All questions were answered.   The patient expressed a clear understanding of the above.     Follow-up Raymond [address] 32 in 6 months for monitoring and Sergo Li, JASON-BC  [address] of [address], [address] [phone]

## 2023-08-08 ENCOUNTER — OFFICE VISIT (OUTPATIENT)
Age: 73
End: 2023-08-08
Payer: MEDICARE

## 2023-08-08 VITALS
HEART RATE: 86 BPM | TEMPERATURE: 97.6 F | WEIGHT: 186.4 LBS | OXYGEN SATURATION: 98 % | BODY MASS INDEX: 31.06 KG/M2 | DIASTOLIC BLOOD PRESSURE: 66 MMHG | HEIGHT: 65 IN | RESPIRATION RATE: 20 BRPM | SYSTOLIC BLOOD PRESSURE: 180 MMHG

## 2023-08-08 DIAGNOSIS — K76.0 FATTY LIVER: Primary | ICD-10-CM

## 2023-08-08 PROCEDURE — 91200 LIVER ELASTOGRAPHY: CPT | Performed by: NURSE PRACTITIONER

## 2024-08-01 ENCOUNTER — TELEPHONE (OUTPATIENT)
Age: 74
End: 2024-08-01

## 2024-08-01 NOTE — TELEPHONE ENCOUNTER
Called pt to remind her of her upcoming appt and was unable to lvm because her mailbox was full and was not accepting any voicemail

## 2024-08-05 ENCOUNTER — OFFICE VISIT (OUTPATIENT)
Age: 74
End: 2024-08-05
Payer: MEDICARE

## 2024-08-05 ENCOUNTER — HOSPITAL ENCOUNTER (OUTPATIENT)
Facility: HOSPITAL | Age: 74
Setting detail: SPECIMEN
Discharge: HOME | End: 2024-08-08

## 2024-08-05 VITALS
OXYGEN SATURATION: 98 % | TEMPERATURE: 97 F | HEIGHT: 65 IN | WEIGHT: 178.4 LBS | SYSTOLIC BLOOD PRESSURE: 173 MMHG | HEART RATE: 70 BPM | DIASTOLIC BLOOD PRESSURE: 76 MMHG | BODY MASS INDEX: 29.72 KG/M2

## 2024-08-05 DIAGNOSIS — K76.0 FATTY LIVER: ICD-10-CM

## 2024-08-05 DIAGNOSIS — K76.0 FATTY LIVER: Primary | ICD-10-CM

## 2024-08-05 LAB — SENTARA SPECIMEN COLLECTION: NORMAL

## 2024-08-05 PROCEDURE — 99001 SPECIMEN HANDLING PT-LAB: CPT

## 2024-08-05 PROCEDURE — 3077F SYST BP >= 140 MM HG: CPT | Performed by: NURSE PRACTITIONER

## 2024-08-05 PROCEDURE — 91200 LIVER ELASTOGRAPHY: CPT | Performed by: NURSE PRACTITIONER

## 2024-08-05 PROCEDURE — 3078F DIAST BP <80 MM HG: CPT | Performed by: NURSE PRACTITIONER

## 2024-08-05 PROCEDURE — 1123F ACP DISCUSS/DSCN MKR DOCD: CPT | Performed by: NURSE PRACTITIONER

## 2024-08-05 PROCEDURE — 99214 OFFICE O/P EST MOD 30 MIN: CPT | Performed by: NURSE PRACTITIONER

## 2024-08-05 RX ORDER — DIMENHYDRINATE 50 MG
1 TABLET ORAL DAILY
COMMUNITY
Start: 2010-10-19

## 2024-08-05 RX ORDER — LOSARTAN POTASSIUM AND HYDROCHLOROTHIAZIDE 25; 100 MG/1; MG/1
1 TABLET ORAL DAILY
COMMUNITY
Start: 2018-10-15 | End: 2024-08-05

## 2024-08-05 RX ORDER — AMOXICILLIN 500 MG
1000 CAPSULE ORAL 2 TIMES DAILY
COMMUNITY

## 2024-08-05 RX ORDER — DAPAGLIFLOZIN 10 MG/1
1 TABLET, FILM COATED ORAL DAILY
COMMUNITY
Start: 2024-06-25

## 2024-08-05 RX ORDER — BLOOD SUGAR DIAGNOSTIC
STRIP MISCELLANEOUS
COMMUNITY
Start: 2024-07-16

## 2024-08-05 RX ORDER — MULTIVITAMIN
1 TABLET ORAL DAILY
COMMUNITY

## 2024-08-05 RX ORDER — LOSARTAN POTASSIUM 100 MG/1
TABLET ORAL
COMMUNITY
End: 2024-08-05

## 2024-08-05 RX ORDER — PEN NEEDLE, DIABETIC 29 G X1/2"
NEEDLE, DISPOSABLE MISCELLANEOUS
COMMUNITY
Start: 2024-05-23

## 2024-08-05 RX ORDER — LOVASTATIN 20 MG/1
TABLET ORAL
COMMUNITY
Start: 2019-05-02

## 2024-08-05 NOTE — PROGRESS NOTES
[facility]      Sharath Cloud MD, FACP, FACG, FAASLD      DK Devi-CATRACHITA Nieves, [facility]   Anna Sanford, [facility]   Paola Parmarosta, [facility]-  Maninder Reid, [facility]   Joslyn Lerma, [facility]   Magali Balderas, [facility]   at [facility]   [address], [address]   [place]  [address] [phone]   FAX: [phone]  [facility]   [address], [address]   [place]  [address] [phone]   FAX: [phone]     Patient Care Team:  Miesha Grover MD as PCP - General (Internal Medicine Physician)  Ryne Carrizales DPM (Podiatry)  Zuhair Hernandez MD (Nephrology)  Fouzia Cardenas NP (Nurse Practitioner)  Jose Mitchell MD (Ophthalmology)  Garrett Contreras MD (Gastroenterology)    Patient Active Problem List   Diagnosis    Breast cancer (HCC)    DM type 2 (diabetes mellitus, type 2) (HCC)    Essential hypertension    Fatty liver    Hypercholesterolemia    CRI (chronic renal insufficiency)     Morales Donaldson returns to the [facility] for management of suspected fatty liver.  The active problem list, all pertinent past medical history, medications, radiologic findings and laboratory findings related to the liver disorder were reviewed with the patient.      The patient is a 74 y.o. female who was found to have elevated liver enzymes in 2017.     The patient was diagnosed with breast cancer. She is currently on letrozole daily.    Serologic evaluation for markers of chronic liver disease were positive for MANUEL.     The most recent imaging of the liver was Ultrasound performed in 9/2019.  Results suggest fatty liver disease.        Assessment of liver fibrosis with Fibroscan was performed in the office today. The result was 4.6 kPa which correlates with no

## 2024-08-06 LAB
A/G RATIO: 1.3 RATIO (ref 1.1–2.6)
ALBUMIN: 4 G/DL (ref 3.5–5)
ALP BLD-CCNC: 83 U/L (ref 40–120)
ALT SERPL-CCNC: 25 U/L (ref 5–40)
ANION GAP SERPL CALCULATED.3IONS-SCNC: 13 MMOL/L (ref 3–15)
AST SERPL-CCNC: 19 U/L (ref 10–37)
BASOPHILS ABSOLUTE: 0.1 K/UL (ref 0–0.2)
BASOPHILS RELATIVE PERCENT: 1 % (ref 0–2)
BILIRUB SERPL-MCNC: 0.3 MG/DL (ref 0.2–1.2)
BILIRUBIN DIRECT: <0.2 MG/DL (ref 0–0.3)
BUN BLDV-MCNC: 80 MG/DL (ref 6–22)
CALCIUM SERPL-MCNC: 9.8 MG/DL (ref 8.4–10.5)
CHLORIDE BLD-SCNC: 106 MMOL/L (ref 98–110)
CO2: 22 MMOL/L (ref 20–32)
CREAT SERPL-MCNC: 2.4 MG/DL (ref 0.8–1.4)
EOSINOPHIL # BLD: 6 % (ref 0–6)
EOSINOPHILS ABSOLUTE: 0.5 K/UL (ref 0–0.5)
GFR, ESTIMATED: 20.9 ML/MIN/1.73 SQ.M.
GLOBULIN: 3 G/DL (ref 2–4)
GLUCOSE: 205 MG/DL (ref 70–99)
HCT VFR BLD CALC: 32.4 % (ref 35.1–48.3)
HEMOGLOBIN: 10 G/DL (ref 11.7–16.1)
LYMPHOCYTES # BLD: 26 % (ref 20–45)
LYMPHOCYTES ABSOLUTE: 2.2 K/UL (ref 1–4.8)
MCH RBC QN AUTO: 28 PG (ref 26–34)
MCHC RBC AUTO-ENTMCNC: 31 G/DL (ref 31–36)
MCV RBC AUTO: 89 FL (ref 80–99)
MONOCYTES ABSOLUTE: 0.5 K/UL (ref 0.1–1)
MONOCYTES: 6 % (ref 3–12)
NEUTROPHILS ABSOLUTE: 5.1 K/UL (ref 1.8–7.7)
NEUTROPHILS: 61 % (ref 40–75)
PDW BLD-RTO: 14.9 % (ref 10–15.5)
PLATELET # BLD: 241 K/UL (ref 140–440)
PMV BLD AUTO: 12 FL (ref 9–13)
POTASSIUM SERPL-SCNC: 4.4 MMOL/L (ref 3.5–5.5)
RBC # BLD: 3.63 M/UL (ref 3.8–5.2)
SODIUM BLD-SCNC: 141 MMOL/L (ref 133–145)
TOTAL PROTEIN: 7 G/DL (ref 6.2–8.1)
WBC # BLD: 8.4 K/UL (ref 4–11)